# Patient Record
Sex: FEMALE | Race: WHITE | Employment: UNEMPLOYED | URBAN - METROPOLITAN AREA
[De-identification: names, ages, dates, MRNs, and addresses within clinical notes are randomized per-mention and may not be internally consistent; named-entity substitution may affect disease eponyms.]

---

## 2017-01-18 ENCOUNTER — OFFICE VISIT (OUTPATIENT)
Dept: PAIN MANAGEMENT | Age: 62
End: 2017-01-18

## 2017-01-18 VITALS
DIASTOLIC BLOOD PRESSURE: 86 MMHG | HEART RATE: 88 BPM | SYSTOLIC BLOOD PRESSURE: 142 MMHG | WEIGHT: 110 LBS | BODY MASS INDEX: 18.3 KG/M2

## 2017-01-18 DIAGNOSIS — M43.10 SPONDYLOLISTHESIS, GRADE 1: ICD-10-CM

## 2017-01-18 DIAGNOSIS — G89.29 CHRONIC MIDLINE LOW BACK PAIN, WITH SCIATICA PRESENCE UNSPECIFIED: ICD-10-CM

## 2017-01-18 DIAGNOSIS — G89.4 CHRONIC PAIN SYNDROME: ICD-10-CM

## 2017-01-18 DIAGNOSIS — M50.30 DDD (DEGENERATIVE DISC DISEASE), CERVICAL: ICD-10-CM

## 2017-01-18 DIAGNOSIS — M51.36 DDD (DEGENERATIVE DISC DISEASE), LUMBAR: ICD-10-CM

## 2017-01-18 DIAGNOSIS — M48.02 CERVICAL STENOSIS OF SPINAL CANAL: ICD-10-CM

## 2017-01-18 DIAGNOSIS — M62.838 MUSCLE SPASMS OF BOTH LOWER EXTREMITIES: ICD-10-CM

## 2017-01-18 DIAGNOSIS — Z79.899 ENCOUNTER FOR LONG-TERM (CURRENT) USE OF MEDICATIONS: Primary | ICD-10-CM

## 2017-01-18 DIAGNOSIS — M54.5 CHRONIC MIDLINE LOW BACK PAIN, WITH SCIATICA PRESENCE UNSPECIFIED: ICD-10-CM

## 2017-01-18 DIAGNOSIS — M47.817 LUMBOSACRAL SPONDYLOSIS WITHOUT MYELOPATHY: ICD-10-CM

## 2017-01-18 RX ORDER — OXYCODONE HCL 20 MG/1
20 TABLET, FILM COATED, EXTENDED RELEASE ORAL EVERY 12 HOURS
Qty: 60 TAB | Refills: 0 | Status: SHIPPED | OUTPATIENT
Start: 2017-01-25 | End: 2017-03-15 | Stop reason: SDUPTHER

## 2017-01-18 RX ORDER — OXYCODONE HYDROCHLORIDE 10 MG/1
10 TABLET ORAL
Qty: 90 TAB | Refills: 0 | Status: SHIPPED | OUTPATIENT
Start: 2017-01-25 | End: 2017-03-15 | Stop reason: SDUPTHER

## 2017-01-18 RX ORDER — OXYCODONE HCL 20 MG/1
20 TABLET, FILM COATED, EXTENDED RELEASE ORAL EVERY 12 HOURS
Qty: 60 TAB | Refills: 0 | Status: SHIPPED | OUTPATIENT
Start: 2017-02-24 | End: 2017-03-15 | Stop reason: SDUPTHER

## 2017-01-18 RX ORDER — OXYCODONE HYDROCHLORIDE 10 MG/1
10 TABLET ORAL
Qty: 90 TAB | Refills: 0 | Status: SHIPPED | OUTPATIENT
Start: 2017-02-24 | End: 2017-03-15 | Stop reason: SDUPTHER

## 2017-01-18 NOTE — PROGRESS NOTES
HISTORY OF PRESENT ILLNESS  Maribel Russo is a 64 y.o. female    HPI: Ms. Daniel Israel  returns today for f/u of chronic low back pain and neck pain. No lumbar or neck surgery. Prior injections with no benefit. No h/o PT. She continues unchanged since last visit. She reports that she spent some time with her mother in [place] and had a great time. She is still complaining of stressful situation at her home with her friends son. She is planning to have him evicted soon. She is otherwise doing well. She has been following up with her family care doctor regarding her depression/anxiety and reports that she is doing better. I would like to have her follow-up in 2 month for further evaluation and closer follow-up. Current management consists of OxyContin 20 mg every 12 hours, Valium 5 mg every 8 hours (further refills will be from her PCP), and Oxycodone IR 10 mg 3 times a day as needed. Medications are helping with pain control and quality of life. Her pain is 4/10 with medication and 8/10 without. Pt describes pain as aching and stabbing. Aggravating factors include bending and lifting. Relieved with rest, medication, and avoiding painful activities. Current treatment is helping to improve general activity, mood, walking, sleep, enjoyment of life    She  is otherwise doing well with no other complaints today. She denies any adverse events including nausea, vomiting, dizziness, constipation, hallucinations, or seizures. Medications were brought to visit today. Pill counts were appropriate    POC UDS today. Confirmation pending. PRIOR IMAGIN. MRI of the cervical spine shows multilevel DDD with osteophyte complex and disc protrusion. 2. MRI of the lumbar spine multilevel DDD with mild disc bulge and L5-S1 mild to moderate left and moderate right foraminal stenosis. Allergies   Allergen Reactions    Prozac [Fluoxetine] Rash and Itching       History reviewed.  No pertinent past surgical history. Review of Systems   Constitutional: Negative for chills and fever. HENT: Negative for congestion, ear discharge, ear pain and sore throat. Eyes: Negative for pain and discharge. Respiratory: Negative for hemoptysis and wheezing. Cardiovascular: Negative for chest pain and palpitations. Gastrointestinal: Negative for blood in stool, constipation, heartburn, melena and nausea. Genitourinary: Negative for dysuria and hematuria. Musculoskeletal: Positive for back pain, myalgias and neck pain. Skin: Negative for itching. Neurological: Negative for seizures and loss of consciousness. Psychiatric/Behavioral: Positive for depression. Negative for hallucinations, substance abuse and suicidal ideas. The patient is nervous/anxious. Physical Exam   Constitutional: She is oriented to person, place, and time and well-developed, well-nourished, and in no distress. No distress. HENT:   Head: Normocephalic and atraumatic. Eyes: EOM are normal.   Neck: Normal range of motion. Pulmonary/Chest: Effort normal.   Musculoskeletal: Normal range of motion. Neurological: She is alert and oriented to person, place, and time. Skin: Skin is warm and dry. No rash noted. She is not diaphoretic. No erythema. Psychiatric: Mood, memory, affect and judgment normal.   Nursing note and vitals reviewed. ASSESSMENT:    1. Encounter for long-term (current) use of medications    2. Chronic pain syndrome    3. DDD (degenerative disc disease), cervical    4. DDD (degenerative disc disease), lumbar    5. Lumbosacral spondylosis without myelopathy    6. Chronic midline low back pain, with sciatica presence unspecified    7. Spondylolisthesis, grade 1    8. Muscle spasms of both lower extremities    9.  Cervical stenosis of spinal canal           [place] Prescription Monitoring Program was reviewed which does not demonstrate aberrancies and/or inconsistencies with regard to the historical prescribing of controlled medications to this patient by other providers. PLAN / Pt Instructions:  1. Continue current plan with no evidence of addiction or diversion. Stable on current medication without adverse events. 2. Refill  Oxycontin  20 mg 2 times daily  3. Refill Oxycodone 10 mg up to 3 times daily as needed  4. Continue Valium 5 mg up to 3 times day as needed. She will discuss any further refills with her PCP and/or psychiatrist.  5. Discussed risks of addiction, dependency, and opioid induced hyperalgesia. 6. Return to clinic in 2 months    Medications Ordered Today   Medications    oxyCODONE ER (OXYCONTIN) 20 mg ER tablet     Sig: Take 1 Tab by mouth every twelve (12) hours for 30 days. Max Daily Amount: 40 mg. Dispense:  60 Tab     Refill:  0    oxyCODONE ER (OXYCONTIN) 20 mg ER tablet     Sig: Take 1 Tab by mouth every twelve (12) hours for 30 days. Max Daily Amount: 40 mg. Indications: CHRONIC PAIN, SEVERE PAIN     Dispense:  60 Tab     Refill:  0    oxyCODONE IR (ROXICODONE) 10 mg tab immediate release tablet     Sig: Take 1 Tab by mouth every eight (8) hours as needed for up to 30 days. Max Daily Amount: 30 mg. Dispense:  90 Tab     Refill:  0    oxyCODONE IR (ROXICODONE) 10 mg tab immediate release tablet     Sig: Take 1 Tab by mouth every eight (8) hours as needed for up to 30 days. Max Daily Amount: 30 mg. Indications: PAIN     Dispense:  90 Tab     Refill:  0       Pain medications prescribed with the objective of pain relief and improved physical and psychosocial function in this patient. Spent 25 minutes with patient today reviewing the treatment plan, goals of treatment plan, and limitations of the treatment plan, to include the potential for side effects from medications and procedures. Heidi Gandhi 1/18/2017      Note: Please excuse any typographical errors. Voice recognition software was used for this note and may cause mistakes.

## 2017-01-18 NOTE — PATIENT INSTRUCTIONS
1. Continue current plan with no evidence of addiction or diversion. Stable on current medication without adverse events. 2. Refill  Oxycontin  20 mg 2 times daily  3. Refill Oxycodone 10 mg up to 3 times daily as needed  4. Continue Valium 5 mg up to 3 times day as needed. She will discuss any further refills with her PCP and/or psychiatrist.  5. Discussed risks of addiction, dependency, and opioid induced hyperalgesia.    6. Return to clinic in 2 months

## 2017-01-18 NOTE — MR AVS SNAPSHOT
Visit Information Date & Time Provider Department Dept. Phone Encounter #  
 1/18/2017  1:00 PM Ada Mo, [facility] [phone] Follow-up Instructions Return in about 2 months (around 3/18/2017). Upcoming Health Maintenance Date Due Hepatitis C Screening 1955 Pneumococcal 19-64 Medium Risk (1 of 1 - PPSV23) 1/22/1974 DTaP/Tdap/Td series (1 - Tdap) 1/22/1976 PAP AKA CERVICAL CYTOLOGY 1/22/1976 BREAST CANCER SCRN MAMMOGRAM 1/22/2005 FOBT Q 1 YEAR AGE 50-75 1/22/2005 ZOSTER VACCINE AGE 60> 1/22/2015 INFLUENZA AGE 9 TO ADULT 8/1/2016 Allergies as of 1/18/2017  Review Complete On: 1/18/2017 By: Jocelyne Contreras LPN Severity Noted Reaction Type Reactions Prozac [Fluoxetine]  07/31/2013    Rash, Itching Current Immunizations  Never Reviewed No immunizations on file. Not reviewed this visit You Were Diagnosed With   
  
 Codes Comments Encounter for long-term (current) use of medications    -  Primary ICD-10-CM: N18.351 ICD-9-CM: V58.69 Chronic pain syndrome     ICD-10-CM: G89.4 ICD-9-CM: 338.4 DDD (degenerative disc disease), cervical     ICD-10-CM: M50.30 ICD-9-CM: 722.4 DDD (degenerative disc disease), lumbar     ICD-10-CM: M51.36 
ICD-9-CM: 722.52 Lumbosacral spondylosis without myelopathy     ICD-10-CM: S68.274 ICD-9-CM: 692. 3 Chronic midline low back pain, with sciatica presence unspecified     ICD-10-CM: M54.5, G89.29 ICD-9-CM: 724.2, 338.29 Spondylolisthesis, grade 1     ICD-10-CM: M43.10 ICD-9-CM: 738.4 Muscle spasms of both lower extremities     ICD-10-CM: S30.639 ICD-9-CM: 728.85 Cervical stenosis of spinal canal     ICD-10-CM: M48.02 
ICD-9-CM: 723.0 Vitals BP Pulse Weight(growth percentile) BMI OB Status Smoking Status  142/86 (BP 1 Location: Right arm, BP Patient Position: Sitting) 88 110 lb (49.9 kg) 18.3 kg/m2 Postmenopausal Current Every Day Smoker Vitals History BMI and BSA Data Body Mass Index Body Surface Area  
 18.3 kg/m 2 1.51 m 2 Preferred Pharmacy Pharmacy Name Phone Justine Tam [facility] [phone] Your Updated Medication List  
  
   
This list is accurate as of: 1/18/17  1:34 PM.  Always use your most recent med list.  
  
  
  
  
 aspirin 81 mg tablet Take 81 mg by mouth.  
  
 multivitamin tablet Commonly known as:  ONE A DAY Take 1 Tab by mouth daily. * oxyCODONE ER 20 mg ER tablet Commonly known as:  OxyCONTIN Take 1 Tab by mouth every twelve (12) hours for 30 days. Max Daily Amount: 40 mg. Start taking on:  1/25/2017 * oxyCODONE IR 10 mg Tab immediate release tablet Commonly known as:  Corrinne Mitten Take 1 Tab by mouth every eight (8) hours as needed for up to 30 days. Max Daily Amount: 30 mg. Start taking on:  1/25/2017 * oxyCODONE ER 20 mg ER tablet Commonly known as:  OxyCONTIN Take 1 Tab by mouth every twelve (12) hours for 30 days. Max Daily Amount: 40 mg. Indications: CHRONIC PAIN, SEVERE PAIN Start taking on:  2/24/2017 * oxyCODONE IR 10 mg Tab immediate release tablet Commonly known as:  Corrinne Mitten Take 1 Tab by mouth every eight (8) hours as needed for up to 30 days. Max Daily Amount: 30 mg. Indications: PAIN Start taking on:  2/24/2017 VITAMIN D3 1,000 unit Cap Generic drug:  cholecalciferol Take  by mouth. * Notice: This list has 4 medication(s) that are the same as other medications prescribed for you. Read the directions carefully, and ask your doctor or other care provider to review them with you. Prescriptions Printed Refills  
 oxyCODONE ER (OXYCONTIN) 20 mg ER tablet 0 Starting on: 1/25/2017 Sig: Take 1 Tab by mouth every twelve (12) hours for 30 days. Max Daily Amount: 40 mg.  
 Class: Print Route: Oral  
 oxyCODONE ER (OXYCONTIN) 20 mg ER tablet 0 Starting on: 2/24/2017 Sig: Take 1 Tab by mouth every twelve (12) hours for 30 days. Max Daily Amount: 40 mg. Indications: CHRONIC PAIN, SEVERE PAIN Class: Print Route: Oral  
 oxyCODONE IR (ROXICODONE) 10 mg tab immediate release tablet 0 Starting on: 1/25/2017 Sig: Take 1 Tab by mouth every eight (8) hours as needed for up to 30 days. Max Daily Amount: 30 mg.  
 Class: Print Route: Oral  
 oxyCODONE IR (ROXICODONE) 10 mg tab immediate release tablet 0 Starting on: 2/24/2017 Sig: Take 1 Tab by mouth every eight (8) hours as needed for up to 30 days. Max Daily Amount: 30 mg. Indications: PAIN Class: Print Route: Oral  
  
We Performed the Following AMB POC DRUG SCREEN () [ [facility] [place]] DRUG SCREEN [[id number] Custom] Follow-up Instructions Return in about 2 months (around 3/18/2017). Patient Instructions 1. Continue current plan with no evidence of addiction or diversion. Stable on current medication without adverse events. 2. Refill  Oxycontin  20 mg 2 times daily 3. Refill Oxycodone 10 mg up to 3 times daily as needed 4. Continue Valium 5 mg up to 3 times day as needed. She will discuss any further refills with her PCP and/or psychiatrist. 
5. Discussed risks of addiction, dependency, and opioid induced hyperalgesia. 6. Return to clinic in 2 months Introducing [facility]! Deion Dubose introduces [employer] patient portal. Now you can access parts of your medical record, email your doctor's office, and request medication refills online. 1. In your internet browser, go to [email or web address] 2. Click on the First Time User? Click Here link in the Sign In box. You will see the New Member Sign Up page. 3. Enter your [employer] Access Code exactly as it appears below. You will not need to use this code after youve completed the sign-up process.  If you do not sign up before the expiration date, you must request a new code. · [employer] Access Code: [email or web address] Expires: 4/18/2017  1:33 PM 
 
4. Enter the last four digits of your Social Security Number (xxxx) and Date of Birth (mm/dd/yyyy) as indicated and click Submit. You will be taken to the next sign-up page. 5. Create a [employer] ID. This will be your [employer] login ID and cannot be changed, so think of one that is secure and easy to remember. 6. Create a [employer] password. You can change your password at any time. 7. Enter your Password Reset Question and Answer. This can be used at a later time if you forget your password. 8. Enter your e-mail address. You will receive e-mail notification when new information is available in [address] 9. Click Sign Up. You can now view and download portions of your medical record. 10. Click the Download Summary menu link to download a portable copy of your medical information. If you have questions, please visit the Frequently Asked Questions section of the [employer] website. Remember, [employer] is NOT to be used for urgent needs. For medical emergencies, dial 911. Now available from your iPhone and Android! Please provide this summary of care documentation to your next provider. Your primary care clinician is listed as NOT ON FILE. If you have any questions after today's visit, please call [phone].

## 2017-01-18 NOTE — PROGRESS NOTES
Nursing Notes    Patient presents to the office today in follow-up. Patient rates her pain at 6/10 on the numerical pain scale. Reviewed medications with counts as follows:    Rx Date filled Qty Dispensed Pill Count Last Dose Short     oxycontin 20mg  12/26/16 60 17 This am  No    Oxycodone 10mg 12/26/16 90 29 This am  No    Valium 5mg 11/23/16 90 51 Today  No                             Comments:     POC UDS was performed in office today    Any new labs or imaging since last appointment? YES; labwork     Have you been to an emergency room (ER) or urgent care clinic since your last visit? YES  ; [facility] for chest pain     Have you been hospitalized since your last visit? NO     If yes, where, when, and reason for visit? Have you seen or consulted any other health care providers outside of the [facility]  since your last visit? YES     If yes, where, when, and reason for visit? Emergency dept physicians       HM deferred to pcp.

## 2017-01-19 LAB
ALCOHOL UR POC: NORMAL
AMPHETAMINES UR POC: NEGATIVE
BARBITURATES UR POC: NEGATIVE
BENZODIAZEPINES UR POC: NORMAL
BUPRENORPHINE UR POC: NORMAL
CANNABINOIDS UR POC: NEGATIVE
CARISOPRODOL UR POC: NORMAL
COCAINE UR POC: NEGATIVE
FENTANYL UR POC: NORMAL
MDMA/ECSTASY UR POC: NEGATIVE
METHADONE UR POC: NEGATIVE
METHAMPHETAMINE UR POC: NEGATIVE
METHYLPHENIDATE UR POC: NEGATIVE
OPIATES UR POC: NEGATIVE
OXYCODONE UR POC: NORMAL
PHENCYCLIDINE UR POC: NORMAL
PROPOXYPHENE UR POC: NORMAL
TRAMADOL UR POC: NORMAL
TRICYCLICS UR POC: NEGATIVE

## 2017-03-15 ENCOUNTER — OFFICE VISIT (OUTPATIENT)
Dept: PAIN MANAGEMENT | Age: 62
End: 2017-03-15

## 2017-03-15 VITALS
WEIGHT: 110 LBS | HEART RATE: 71 BPM | SYSTOLIC BLOOD PRESSURE: 156 MMHG | BODY MASS INDEX: 18.3 KG/M2 | DIASTOLIC BLOOD PRESSURE: 78 MMHG

## 2017-03-15 DIAGNOSIS — M54.5 CHRONIC MIDLINE LOW BACK PAIN, WITH SCIATICA PRESENCE UNSPECIFIED: ICD-10-CM

## 2017-03-15 DIAGNOSIS — G89.4 CHRONIC PAIN SYNDROME: ICD-10-CM

## 2017-03-15 DIAGNOSIS — M43.10 SPONDYLOLISTHESIS, GRADE 1: ICD-10-CM

## 2017-03-15 DIAGNOSIS — M47.812 CERVICAL SPONDYLOSIS WITHOUT MYELOPATHY: ICD-10-CM

## 2017-03-15 DIAGNOSIS — G54.2: ICD-10-CM

## 2017-03-15 DIAGNOSIS — G89.29 CHRONIC MIDLINE LOW BACK PAIN, WITH SCIATICA PRESENCE UNSPECIFIED: ICD-10-CM

## 2017-03-15 DIAGNOSIS — M54.10 RADICULITIS: ICD-10-CM

## 2017-03-15 DIAGNOSIS — M47.817 LUMBOSACRAL SPONDYLOSIS WITHOUT MYELOPATHY: ICD-10-CM

## 2017-03-15 DIAGNOSIS — M48.02 CERVICAL STENOSIS OF SPINAL CANAL: ICD-10-CM

## 2017-03-15 DIAGNOSIS — M54.2 NECK PAIN: ICD-10-CM

## 2017-03-15 RX ORDER — OXYCODONE HCL 20 MG/1
20 TABLET, FILM COATED, EXTENDED RELEASE ORAL EVERY 12 HOURS
Qty: 60 TAB | Refills: 0 | Status: SHIPPED | OUTPATIENT
Start: 2017-04-25 | End: 2017-05-16 | Stop reason: SDUPTHER

## 2017-03-15 RX ORDER — OXYCODONE HYDROCHLORIDE 10 MG/1
10 TABLET ORAL
Qty: 90 TAB | Refills: 0 | Status: SHIPPED | OUTPATIENT
Start: 2017-03-26 | End: 2017-05-16 | Stop reason: SDUPTHER

## 2017-03-15 RX ORDER — OXYCODONE HYDROCHLORIDE 10 MG/1
10 TABLET ORAL
Qty: 90 TAB | Refills: 0 | Status: SHIPPED | OUTPATIENT
Start: 2017-04-25 | End: 2017-05-16 | Stop reason: SDUPTHER

## 2017-03-15 RX ORDER — OXYCODONE HCL 20 MG/1
20 TABLET, FILM COATED, EXTENDED RELEASE ORAL EVERY 12 HOURS
Qty: 60 TAB | Refills: 0 | Status: SHIPPED | OUTPATIENT
Start: 2017-03-26 | End: 2017-05-16 | Stop reason: SDUPTHER

## 2017-03-15 NOTE — PROGRESS NOTES
Nursing Notes    Patient presents to the office today in follow-up. Patient rates her pain at 5/10 on the numerical pain scale. Reviewed medications with counts as follows:    Rx Date filled Qty Dispensed Pill Count Last Dose Short   oxycontin 20 2/24/17 60 27 3/15/17 no   zaid 10 2/24/17 90 37 3/15/17 no         Comments:     POC UDS was not performed in office today    Any new labs or imaging since last appointment? NO    Have you been to an emergency room (ER) or urgent care clinic since your last visit? NO            Have you been hospitalized since your last visit? NO     If yes, where, when, and reason for visit? Have you seen or consulted any other health care providers outside of the [address] [place] [address]  since your last visit? NO     If yes, where, when, and reason for visit? Ms. Ella Farah has a reminder for a \"due or due soon\" health maintenance. I have asked that she contact her primary care provider for follow-up on this health maintenance.

## 2017-03-15 NOTE — PATIENT INSTRUCTIONS
1. Continue current plan with no evidence of addiction or diversion. Stable on current medication without adverse events. 2. Refill  Oxycontin  20 mg 2 times daily  3. Refill Oxycodone 10 mg up to 3 times daily as needed  4. Valium 5 mg up to 3 times day as needed. She will discuss any further refills with her PCP and/or psychiatrist.  5. Discussed risks of addiction, dependency, and opioid induced hyperalgesia.    6. Return to clinic in 2 months

## 2017-03-15 NOTE — PROGRESS NOTES
HISTORY OF PRESENT ILLNESS  Alessandro Fung is a 58 y.o. female    HPI: Ms. Parag Worthy  returns today for f/u of chronic low back pain and neck pain. No lumbar or neck surgery. Prior injections with no benefit. No h/o PT. She continues unchanged since last visit. She continues to complain of stressful situation in her home with her friends son. She has attempted to evict him but has to wait for 64 days. I explained to her previously I would be unable to refill her Valium. She has discussed this medication with her PCP who has recommended changing her medication to a different agent. She is reluctant on changing this medication. I have discussed with her possible referral to psychiatry/psychologist.  She is planning to discuss this further with her PCP. I will have her follow-up in 2 months for reassessment. Current management consists of OxyContin 20 mg every 12 hours, and Oxycodone IR 10 mg 3 times a day as needed. Valium 5 mg will now come from her PCP. Medications are helping with pain control and quality of life. Her pain is 4/10 with medication and 8/10 without. Pt describes pain as aching and stabbing. Aggravating factors include bending and lifting. Relieved with rest, medication, and avoiding painful activities. Current treatment is helping to improve general activity, mood, walking, sleep, enjoyment of life    She  is otherwise doing well with no other complaints today. She denies any adverse events including nausea, vomiting, dizziness, constipation, hallucinations, or seizures. PRIOR IMAGIN. MRI of the cervical spine shows multilevel DDD with osteophyte complex and disc protrusion. 2. MRI of the lumbar spine multilevel DDD with mild disc bulge and L5-S1 mild to moderate left and moderate right foraminal stenosis. Allergies   Allergen Reactions    Prozac [Fluoxetine] Rash and Itching       History reviewed. No pertinent surgical history.       Review of Systems Constitutional: Negative for chills and fever. HENT: Negative for congestion, ear discharge, ear pain and sore throat. Eyes: Negative for pain and discharge. Respiratory: Negative for hemoptysis and wheezing. Cardiovascular: Negative for chest pain and palpitations. Gastrointestinal: Negative for blood in stool, constipation, heartburn, melena and nausea. Genitourinary: Negative for dysuria and hematuria. Musculoskeletal: Positive for back pain, myalgias and neck pain. Skin: Negative for itching. Neurological: Negative for seizures and loss of consciousness. Psychiatric/Behavioral: Positive for depression. Negative for hallucinations, substance abuse and suicidal ideas. The patient is nervous/anxious. Physical Exam   Constitutional: She is oriented to person, place, and time and well-developed, well-nourished, and in no distress. No distress. HENT:   Head: Normocephalic and atraumatic. Eyes: EOM are normal.   Neck: Normal range of motion. Pulmonary/Chest: Effort normal.   Musculoskeletal: Normal range of motion. Neurological: She is alert and oriented to person, place, and time. Skin: Skin is warm and dry. No rash noted. She is not diaphoretic. No erythema. Psychiatric: Mood, memory, affect and judgment normal.   Nursing note and vitals reviewed. ASSESSMENT:    1. Cervical stenosis of spinal canal    2. Neck pain    3. Chronic midline low back pain, with sciatica presence unspecified    4. Chronic pain syndrome    5. Radiculitis    6. Spondylolisthesis, grade 1    7. Lumbosacral spondylosis without myelopathy    8. Cervical spondylosis without myelopathy    9. Cervical root lesions, not elsewhere classified         [place] Prescription Monitoring Program was reviewed which does not demonstrate aberrancies and/or inconsistencies with regard to the historical prescribing of controlled medications to this patient by other providers.     PLAN / Pt Instructions:  1. Continue current plan with no evidence of addiction or diversion. Stable on current medication without adverse events. 2. Refill Oxycontin  20 mg 2 times daily  3. Refill Oxycodone 10 mg up to 3 times daily as needed  4. Continue Valium 5 mg up to 3 times day from PCP  5. Discussed risks of addiction, dependency, and opioid induced hyperalgesia. 6. Return to clinic in 2 months    Medications Ordered Today   Medications    oxyCODONE ER (OXYCONTIN) 20 mg ER tablet     Sig: Take 1 Tab by mouth every twelve (12) hours for 30 days. Max Daily Amount: 40 mg. Indications: Chronic Pain, Severe Pain     Dispense:  60 Tab     Refill:  0    oxyCODONE ER (OXYCONTIN) 20 mg ER tablet     Sig: Take 1 Tab by mouth every twelve (12) hours for 30 days. Max Daily Amount: 40 mg. Dispense:  60 Tab     Refill:  0    oxyCODONE IR (ROXICODONE) 10 mg tab immediate release tablet     Sig: Take 1 Tab by mouth every eight (8) hours as needed for up to 30 days. Max Daily Amount: 30 mg. Indications: Pain     Dispense:  90 Tab     Refill:  0    oxyCODONE IR (ROXICODONE) 10 mg tab immediate release tablet     Sig: Take 1 Tab by mouth every eight (8) hours as needed for up to 30 days. Max Daily Amount: 30 mg. Dispense:  90 Tab     Refill:  0       Pain medications prescribed with the objective of pain relief and improved physical and psychosocial function in this patient. Spent 25 minutes with patient today reviewing the treatment plan, goals of treatment plan, and limitations of the treatment plan, to include the potential for side effects from medications and procedures. Jorja Crigler, [place] 3/15/2017      Note: Please excuse any typographical errors. Voice recognition software was used for this note and may cause mistakes.

## 2017-05-16 ENCOUNTER — OFFICE VISIT (OUTPATIENT)
Dept: PAIN MANAGEMENT | Age: 62
End: 2017-05-16

## 2017-05-16 VITALS — SYSTOLIC BLOOD PRESSURE: 138 MMHG | HEART RATE: 84 BPM | DIASTOLIC BLOOD PRESSURE: 76 MMHG | RESPIRATION RATE: 18 BRPM

## 2017-05-16 DIAGNOSIS — M50.30 DDD (DEGENERATIVE DISC DISEASE), CERVICAL: ICD-10-CM

## 2017-05-16 DIAGNOSIS — F41.1 GAD (GENERALIZED ANXIETY DISORDER): ICD-10-CM

## 2017-05-16 DIAGNOSIS — M47.812 CERVICAL SPONDYLOSIS WITHOUT MYELOPATHY: ICD-10-CM

## 2017-05-16 DIAGNOSIS — G89.29 CHRONIC MIDLINE LOW BACK PAIN, WITH SCIATICA PRESENCE UNSPECIFIED: Primary | ICD-10-CM

## 2017-05-16 DIAGNOSIS — F43.10 PTSD (POST-TRAUMATIC STRESS DISORDER): ICD-10-CM

## 2017-05-16 DIAGNOSIS — M54.2 NECK PAIN: ICD-10-CM

## 2017-05-16 DIAGNOSIS — M51.36 DDD (DEGENERATIVE DISC DISEASE), LUMBAR: ICD-10-CM

## 2017-05-16 DIAGNOSIS — M43.10 SPONDYLOLISTHESIS, GRADE 1: ICD-10-CM

## 2017-05-16 DIAGNOSIS — M47.817 LUMBOSACRAL SPONDYLOSIS WITHOUT MYELOPATHY: ICD-10-CM

## 2017-05-16 DIAGNOSIS — Z79.899 ENCOUNTER FOR LONG-TERM (CURRENT) USE OF MEDICATIONS: ICD-10-CM

## 2017-05-16 DIAGNOSIS — G89.4 CHRONIC PAIN SYNDROME: ICD-10-CM

## 2017-05-16 DIAGNOSIS — M54.5 CHRONIC MIDLINE LOW BACK PAIN, WITH SCIATICA PRESENCE UNSPECIFIED: Primary | ICD-10-CM

## 2017-05-16 DIAGNOSIS — M48.02 CERVICAL STENOSIS OF SPINAL CANAL: ICD-10-CM

## 2017-05-16 DIAGNOSIS — F31.9 BIPOLAR DISORDER, UNSPECIFIED (HCC): ICD-10-CM

## 2017-05-16 LAB
ALCOHOL UR POC: NORMAL
AMPHETAMINES UR POC: NEGATIVE
BARBITURATES UR POC: NEGATIVE
BENZODIAZEPINES UR POC: NEGATIVE
BUPRENORPHINE UR POC: NORMAL
CANNABINOIDS UR POC: NEGATIVE
CARISOPRODOL UR POC: NORMAL
COCAINE UR POC: NEGATIVE
FENTANYL UR POC: NORMAL
MDMA/ECSTASY UR POC: NEGATIVE
METHADONE UR POC: NEGATIVE
METHAMPHETAMINE UR POC: NEGATIVE
METHYLPHENIDATE UR POC: NEGATIVE
OPIATES UR POC: NEGATIVE
OXYCODONE UR POC: NORMAL
PHENCYCLIDINE UR POC: NEGATIVE
PROPOXYPHENE UR POC: NORMAL
TRAMADOL UR POC: NORMAL
TRICYCLICS UR POC: NEGATIVE

## 2017-05-16 RX ORDER — NALOXONE HYDROCHLORIDE 4 MG/.1ML
4 SPRAY NASAL AS NEEDED
Qty: 1 BOX | Refills: 0 | Status: SHIPPED | OUTPATIENT
Start: 2017-05-16 | End: 2021-09-20

## 2017-05-16 RX ORDER — OXYCODONE HCL 20 MG/1
20 TABLET, FILM COATED, EXTENDED RELEASE ORAL EVERY 12 HOURS
Qty: 60 TAB | Refills: 0 | Status: SHIPPED | OUTPATIENT
Start: 2017-05-16 | End: 2017-07-17 | Stop reason: SDUPTHER

## 2017-05-16 RX ORDER — OXYCODONE HYDROCHLORIDE 10 MG/1
10 TABLET ORAL
Qty: 90 TAB | Refills: 0 | Status: SHIPPED | OUTPATIENT
Start: 2017-05-16 | End: 2017-07-17 | Stop reason: SDUPTHER

## 2017-05-16 RX ORDER — OXYCODONE HCL 20 MG/1
20 TABLET, FILM COATED, EXTENDED RELEASE ORAL EVERY 12 HOURS
Qty: 60 TAB | Refills: 0 | Status: SHIPPED | OUTPATIENT
Start: 2017-06-15 | End: 2017-07-17 | Stop reason: SDUPTHER

## 2017-05-16 RX ORDER — OXYCODONE HYDROCHLORIDE 10 MG/1
10 TABLET ORAL
Qty: 90 TAB | Refills: 0 | Status: SHIPPED | OUTPATIENT
Start: 2017-06-15 | End: 2017-07-17 | Stop reason: SDUPTHER

## 2017-05-16 NOTE — PROGRESS NOTES
Nursing Notes    Patient presents to the office today in follow-up. Reviewed medications with counts as follows:    Rx Date filled Qty Dispensed Pill Count Last Dose Short   oxycontin 20 mg 4/24/17 60 21 today no   Oxycodone 10 mg 4/24/17 90 32 today no   Ms. Kelley Jeffrey has a reminder for a \"due or due soon\" health maintenance. I have asked that she contact her primary care provider for follow-up on this health maintenance. POC UDS was performed in office today    Any new labs or imaging since last appointment? NO    Have you been to an emergency room (ER) or urgent care clinic since your last visit? NO            Have you been hospitalized since your last visit? NO     If yes, where, when, and reason for visit? Have you seen or consulted any other health care providers outside of the [facility]  since your last visit? NO     If yes, where, when, and reason for visit?

## 2017-05-16 NOTE — MR AVS SNAPSHOT
Visit Information Date & Time Provider Department Dept. Phone Encounter #  
 5/16/2017  2:30 PM Dayan Jackson MD [facility] [id number] Follow-up Instructions Return in about 2 months (around 7/16/2017). Follow-up and Disposition History Upcoming Health Maintenance Date Due Hepatitis C Screening 1955 Pneumococcal 19-64 Medium Risk (1 of 1 - PPSV23) 1/22/1974 DTaP/Tdap/Td series (1 - Tdap) 1/22/1976 PAP AKA CERVICAL CYTOLOGY 1/22/1976 BREAST CANCER SCRN MAMMOGRAM 1/22/2005 FOBT Q 1 YEAR AGE 50-75 1/22/2005 ZOSTER VACCINE AGE 60> 1/22/2015 INFLUENZA AGE 9 TO ADULT 8/1/2017 Allergies as of 5/16/2017  Review Complete On: 5/16/2017 By: Dayan Jackson MD  
  
 Severity Noted Reaction Type Reactions Prozac [Fluoxetine]  07/31/2013    Rash, Itching Current Immunizations  Never Reviewed No immunizations on file. Not reviewed this visit You Were Diagnosed With   
  
 Codes Comments Chronic midline low back pain, with sciatica presence unspecified    -  Primary ICD-10-CM: M54.5, G89.29 ICD-9-CM: 724.2, 338.29 Neck pain     ICD-10-CM: M54.2 ICD-9-CM: 723.1 Encounter for long-term (current) use of medications     ICD-10-CM: Z79.899 ICD-9-CM: V58.69 Cervical stenosis of spinal canal     ICD-10-CM: M48.02 
ICD-9-CM: 723.0 Chronic pain syndrome     ICD-10-CM: G89.4 ICD-9-CM: 338.4 KASH (generalized anxiety disorder)     ICD-10-CM: F41.1 ICD-9-CM: 300.02 PTSD (post-traumatic stress disorder)     ICD-10-CM: F43.10 ICD-9-CM: 309.81 Bipolar disorder, unspecified ([facility]     ICD-10-CM: F31.9 ICD-9-CM: 296.80 Spondylolisthesis, grade 1     ICD-10-CM: M43.10 ICD-9-CM: 738.4 DDD (degenerative disc disease), lumbar     ICD-10-CM: M51.36 
ICD-9-CM: 722.52 Lumbosacral spondylosis without myelopathy     ICD-10-CM: H30.707 ICD-9-CM: 721.3 DDD (degenerative disc disease), cervical     ICD-10-CM: M50.30 ICD-9-CM: 722.4 Cervical spondylosis without myelopathy     ICD-10-CM: M77.033 ICD-9-CM: 721.0 Vitals BP Pulse Resp OB Status Smoking Status 138/76 84 18 Postmenopausal Current Every Day Smoker Vitals History Preferred Pharmacy Pharmacy Name Phone [address] [facility] [phone] Your Updated Medication List  
  
   
This list is accurate as of: 5/16/17  2:41 PM.  Always use your most recent med list.  
  
  
  
  
 aspirin 81 mg tablet Take 81 mg by mouth.  
  
 multivitamin tablet Commonly known as:  ONE A DAY Take 1 Tab by mouth daily. naloxone 4 mg/actuation Spry 4 mg by Nasal route as needed. * oxyCODONE ER 20 mg ER tablet Commonly known as:  OxyCONTIN Take 1 Tab by mouth every twelve (12) hours for 30 days. Max Daily Amount: 40 mg.  
  
 * oxyCODONE IR 10 mg Tab immediate release tablet Commonly known as:  Cristo Ton Take 1 Tab by mouth every eight (8) hours as needed for up to 30 days. Max Daily Amount: 30 mg.  
  
 * oxyCODONE ER 20 mg ER tablet Commonly known as:  OxyCONTIN Take 1 Tab by mouth every twelve (12) hours for 30 days. Max Daily Amount: 40 mg. Indications: Chronic Pain, Severe Pain Start taking on:  6/15/2017 * oxyCODONE IR 10 mg Tab immediate release tablet Commonly known as:  Cristo Ton Take 1 Tab by mouth every eight (8) hours as needed for up to 30 days. Max Daily Amount: 30 mg. Indications: Pain Start taking on:  6/15/2017 VITAMIN D3 1,000 unit Cap Generic drug:  cholecalciferol Take  by mouth. * Notice: This list has 4 medication(s) that are the same as other medications prescribed for you. Read the directions carefully, and ask your doctor or other care provider to review them with you. Prescriptions Printed Refills oxyCODONE ER (OXYCONTIN) 20 mg ER tablet 0 Sig: Take 1 Tab by mouth every twelve (12) hours for 30 days. Max Daily Amount: 40 mg.  
 Class: Print Route: Oral  
 oxyCODONE IR (ROXICODONE) 10 mg tab immediate release tablet 0 Sig: Take 1 Tab by mouth every eight (8) hours as needed for up to 30 days. Max Daily Amount: 30 mg.  
 Class: Print Route: Oral  
 oxyCODONE ER (OXYCONTIN) 20 mg ER tablet 0 Starting on: 6/15/2017 Sig: Take 1 Tab by mouth every twelve (12) hours for 30 days. Max Daily Amount: 40 mg. Indications: Chronic Pain, Severe Pain Class: Print Route: Oral  
 oxyCODONE IR (ROXICODONE) 10 mg tab immediate release tablet 0 Starting on: 6/15/2017 Sig: Take 1 Tab by mouth every eight (8) hours as needed for up to 30 days. Max Daily Amount: 30 mg. Indications: Pain Class: Print Route: Oral  
 naloxone 4 mg/actuation spry 0 Si mg by Nasal route as needed. Class: Print Route: Nasal  
  
We Performed the Following AMB POC DRUG SCREEN () [ [facility]] DRUG SCREEN [[id number] Custom] Follow-up Instructions Return in about 2 months (around 2017). Introducing [facility]! Mark Leonard introduces [employer] patient portal. Now you can access parts of your medical record, email your doctor's office, and request medication refills online. 1. In your internet browser, go to [email or web address] 2. Click on the First Time User? Click Here link in the Sign In box. You will see the New Member Sign Up page. 3. Enter your [employer] Access Code exactly as it appears below. You will not need to use this code after youve completed the sign-up process. If you do not sign up before the expiration date, you must request a new code. · [employer] Access Code: [email or web address] Expires: 2017  2:37 PM 
 
4.  Enter the last four digits of your Social Security Number (xxxx) and Date of Birth (mm/dd/yyyy) as indicated and click Submit. You will be taken to the next sign-up page. 5. Create a [employer] ID. This will be your [employer] login ID and cannot be changed, so think of one that is secure and easy to remember. 6. Create a [employer] password. You can change your password at any time. 7. Enter your Password Reset Question and Answer. This can be used at a later time if you forget your password. 8. Enter your e-mail address. You will receive e-mail notification when new information is available in [address] 9. Click Sign Up. You can now view and download portions of your medical record. 10. Click the Download Summary menu link to download a portable copy of your medical information. If you have questions, please visit the Frequently Asked Questions section of the [employer] website. Remember, [employer] is NOT to be used for urgent needs. For medical emergencies, dial 911. Now available from your iPhone and Android! Please provide this summary of care documentation to your next provider. Your primary care clinician is listed as NOT ON FILE. If you have any questions after today's visit, please call [phone].

## 2017-05-16 NOTE — PROGRESS NOTES
HISTORY OF PRESENT ILLNESS  Shayne Salmeron is a 58 y.o. female. HPI Comments: Meds help with pain control and quality of life. No new side effects reported today. Visit survey reviewed and will be scanned.  reviewed. Recent average level of pain(out of 10)-5  Chief complaint low back pain, neck pain  Chronic pain  Medication helps improve multiple aspects of quality of life. 70% complete relief in the past 30 days  She is continuing to use OxyContin 20 mg twice a day  Oxycodone 10 mg 3 times a day as needed      Measuring clinical outcomes of chronic pain patients. This was reviewed today. The survey will be scanned. Please see the survey for details. Total score-8      Review of Systems   Constitutional: Negative for chills and fever. HENT: Negative for congestion, ear discharge, ear pain and sore throat. Eyes: Negative for pain and discharge. Respiratory: Negative for hemoptysis and wheezing. Cardiovascular: Negative for chest pain and palpitations. Gastrointestinal: Negative for blood in stool, constipation, heartburn, melena and nausea. Genitourinary: Negative for dysuria and hematuria. Musculoskeletal: Positive for back pain, myalgias and neck pain. Skin: Negative for itching. Neurological: Negative for seizures and loss of consciousness. Psychiatric/Behavioral: Positive for depression. Negative for hallucinations, substance abuse and suicidal ideas. The patient is nervous/anxious. Physical Exam   Constitutional: She appears well-developed and well-nourished. She is cooperative. She does not have a sickly appearance. HENT:   Head: Normocephalic and atraumatic. Right Ear: External ear normal. No drainage. Left Ear: External ear normal. No drainage. Nose: Nose normal.   Eyes: Lids are normal. Right eye exhibits no discharge. Left eye exhibits no discharge. Right conjunctiva has no hemorrhage. Left conjunctiva has no hemorrhage. Neck: Neck supple.  No tracheal deviation present. No thyroid mass present. Pulmonary/Chest: Effort normal. No respiratory distress. Neurological: She is alert. No cranial nerve deficit. Skin: Skin is intact. No rash noted. Psychiatric: Her speech is normal. Her affect is not angry. She does not express inappropriate judgment. Nursing note and vitals reviewed. ASSESSMENT and PLAN  Encounter Diagnoses   Name Primary?  Chronic midline low back pain, with sciatica presence unspecified Yes    Neck pain     Encounter for long-term (current) use of medications     Cervical stenosis of spinal canal     Chronic pain syndrome     KASH (generalized anxiety disorder)     PTSD (post-traumatic stress disorder)     Bipolar disorder, unspecified (HCC)     Spondylolisthesis, grade 1     DDD (degenerative disc disease), lumbar     Lumbosacral spondylosis without myelopathy     DDD (degenerative disc disease), cervical     Cervical spondylosis without myelopathy    No indicators for recent medication misuse.  reviewed. Pain Meds and Quality Of Life have been reviewed. Nonpharmacologic therapy and non-opioid pharmacologic therapy were considered. If opioid therapy is prescribed, this is only if the expected benefits are anticipated to outweigh risks. Possible changes to treatment plan considered. Support/education given as needed. Today-medications are as listed. No significant changes to medications. Follow up -- 2 months.    --Urine test or oral swab today. Also, the prescription monitoring program was reviewed today. The majority of today's visit was spent counseling and coordinating care. Total visit time-40 minutes. -Dragon medical dictation software was used for portions of this report. Unintended errors may occur. Because the patient's current regimen places him/her at increased risk for possible overdose, a prescription for naloxone is being provided.   The patient understands that this medication is only to be used in the setting of a possible overdose and that inadvertent use of this medication could precipitate overt withdrawal.  I discussed naloxone with the patient today. In addition, the patient has received the naloxone instruction sheet.

## 2017-07-17 ENCOUNTER — OFFICE VISIT (OUTPATIENT)
Dept: PAIN MANAGEMENT | Age: 62
End: 2017-07-17

## 2017-07-17 VITALS
HEART RATE: 87 BPM | SYSTOLIC BLOOD PRESSURE: 144 MMHG | TEMPERATURE: 98.1 F | RESPIRATION RATE: 12 BRPM | BODY MASS INDEX: 17.49 KG/M2 | HEIGHT: 65 IN | WEIGHT: 105 LBS | DIASTOLIC BLOOD PRESSURE: 91 MMHG

## 2017-07-17 DIAGNOSIS — M48.02 CERVICAL STENOSIS OF SPINAL CANAL: ICD-10-CM

## 2017-07-17 DIAGNOSIS — G89.29 CHRONIC MIDLINE LOW BACK PAIN, WITH SCIATICA PRESENCE UNSPECIFIED: ICD-10-CM

## 2017-07-17 DIAGNOSIS — M54.5 CHRONIC MIDLINE LOW BACK PAIN, WITH SCIATICA PRESENCE UNSPECIFIED: ICD-10-CM

## 2017-07-17 DIAGNOSIS — Z79.899 ENCOUNTER FOR LONG-TERM (CURRENT) USE OF HIGH-RISK MEDICATION: ICD-10-CM

## 2017-07-17 DIAGNOSIS — M41.9 SCOLIOSIS, UNSPECIFIED SCOLIOSIS TYPE, UNSPECIFIED SPINAL REGION: ICD-10-CM

## 2017-07-17 DIAGNOSIS — G89.4 CHRONIC PAIN SYNDROME: ICD-10-CM

## 2017-07-17 DIAGNOSIS — M50.30 DDD (DEGENERATIVE DISC DISEASE), CERVICAL: ICD-10-CM

## 2017-07-17 DIAGNOSIS — M62.838 MUSCLE SPASMS OF BOTH LOWER EXTREMITIES: ICD-10-CM

## 2017-07-17 DIAGNOSIS — M47.812 CERVICAL SPONDYLOSIS WITHOUT MYELOPATHY: ICD-10-CM

## 2017-07-17 DIAGNOSIS — M51.36 DDD (DEGENERATIVE DISC DISEASE), LUMBAR: Primary | ICD-10-CM

## 2017-07-17 RX ORDER — OXYCODONE HYDROCHLORIDE 10 MG/1
10 TABLET ORAL
Qty: 90 TAB | Refills: 0 | Status: SHIPPED | OUTPATIENT
Start: 2017-07-25 | End: 2017-09-12 | Stop reason: SDUPTHER

## 2017-07-17 RX ORDER — OXYCODONE HCL 20 MG/1
20 TABLET, FILM COATED, EXTENDED RELEASE ORAL EVERY 12 HOURS
COMMUNITY
End: 2018-06-22 | Stop reason: SDUPTHER

## 2017-07-17 RX ORDER — OXYCODONE HYDROCHLORIDE 10 MG/1
10 TABLET ORAL
Qty: 90 TAB | Refills: 0 | Status: SHIPPED | OUTPATIENT
Start: 2017-08-24 | End: 2017-09-23

## 2017-07-17 RX ORDER — OXYCODONE HCL 20 MG/1
20 TABLET, FILM COATED, EXTENDED RELEASE ORAL EVERY 12 HOURS
Qty: 60 TAB | Refills: 0 | Status: SHIPPED | OUTPATIENT
Start: 2017-07-24 | End: 2017-09-12 | Stop reason: SDUPTHER

## 2017-07-17 RX ORDER — OXYCODONE HCL 20 MG/1
20 TABLET, FILM COATED, EXTENDED RELEASE ORAL EVERY 12 HOURS
Qty: 60 TAB | Refills: 0 | Status: SHIPPED | OUTPATIENT
Start: 2017-08-23 | End: 2017-09-22

## 2017-07-17 RX ORDER — OXYCODONE HYDROCHLORIDE 10 MG/1
10 TABLET ORAL
COMMUNITY
End: 2018-06-22 | Stop reason: SDUPTHER

## 2017-07-17 NOTE — PROGRESS NOTES
Nursing Notes    Patient presents to the office today in follow-up. Patient rates her pain at 5/10 on the numerical pain scale. Reviewed medications with counts as follows:    Rx Date filled Qty Dispensed Pill Count Last Dose Short   Oxycodone 10mg 06/25/17 90 34 today no   oxycontin CR 20mg 06/24/17 60 24 yesterday no                                  Comments:     POC UDS was not performed in office today    Any new labs or imaging since last appointment? NO    Have you been to an emergency room (ER) or urgent care clinic since your last visit? NO            Have you been hospitalized since your last visit? NO     If yes, where, when, and reason for visit? Have you seen or consulted any other health care providers outside of the [address] [place] [address]  since your last visit? NO     If yes, where, when, and reason for visit? HM deferred to pcp.

## 2017-07-17 NOTE — PATIENT INSTRUCTIONS
PLAN / Pt Instructions:  1. Continue current plan with no evidence of addiction or diversion. Stable on current medication without adverse events. 2. Refill Oxycontin  20 mg 2 times daily  3. Refill Oxycodone 10 mg up to 3 times daily as needed  4. Discussed risks of addiction, dependency, and opioid induced hyperalgesia.    5. Return to clinic in 2 months

## 2017-07-17 NOTE — MR AVS SNAPSHOT
Visit Information Date & Time Provider Department Dept. Phone Encounter #  
 7/17/2017 11:00 AM Jessy Palomares [address] [place] [address] for Pain Management [id number] Follow-up Instructions Return in about 2 months (around 9/17/2017). Upcoming Health Maintenance Date Due Hepatitis C Screening 1955 Pneumococcal 19-64 Medium Risk (1 of 1 - PPSV23) 1/22/1974 DTaP/Tdap/Td series (1 - Tdap) 1/22/1976 PAP AKA CERVICAL CYTOLOGY 1/22/1976 BREAST CANCER SCRN MAMMOGRAM 1/22/2005 FOBT Q 1 YEAR AGE 50-75 1/22/2005 ZOSTER VACCINE AGE 60> 1/22/2015 INFLUENZA AGE 9 TO ADULT 8/1/2017 Allergies as of 7/17/2017  Review Complete On: 7/17/2017 By: Kellee Bueno PA-C Severity Noted Reaction Type Reactions Prozac [Fluoxetine]  07/31/2013    Rash, Itching Current Immunizations  Never Reviewed No immunizations on file. Not reviewed this visit You Were Diagnosed With   
  
 Codes Comments Cervical stenosis of spinal canal     ICD-10-CM: M48.02 
ICD-9-CM: 723.0 Vitals BP Pulse Temp Resp Height(growth percentile) Weight(growth percentile) (!) 144/91 87 98.1 °F (36.7 °C) 12 5' 5\" (1.651 m) 105 lb (47.6 kg) BMI OB Status Smoking Status 17.47 kg/m2 Postmenopausal Current Every Day Smoker BMI and BSA Data Body Mass Index Body Surface Area  
 17.47 kg/m 2 1.48 m 2 Preferred Pharmacy Pharmacy Name Phone [facility] [place] [phone] [facility] [phone] Your Updated Medication List  
  
   
This list is accurate as of: 7/17/17 12:04 PM.  Always use your most recent med list.  
  
  
  
  
 aspirin 81 mg tablet Take 81 mg by mouth.  
  
 multivitamin tablet Commonly known as:  ONE A DAY Take 1 Tab by mouth daily. naloxone 4 mg/actuation Spry 4 mg by Nasal route as needed. * oxyCODONE ER 20 mg ER tablet Commonly known as:  OxyCONTIN Take 20 mg by mouth every twelve (12) hours. * oxyCODONE IR 10 mg Tab immediate release tablet Commonly known as:  Seldon Breslow Take  by mouth. * oxyCODONE ER 20 mg ER tablet Commonly known as:  OxyCONTIN Take 1 Tab by mouth every twelve (12) hours for 30 days. Max Daily Amount: 40 mg. Indications: Chronic Pain, Severe Pain Start taking on:  7/24/2017 * oxyCODONE IR 10 mg Tab immediate release tablet Commonly known as:  Seldon Breslow Take 1 Tab by mouth every eight (8) hours as needed for up to 30 days. Max Daily Amount: 30 mg. Indications: Pain Start taking on:  7/25/2017 * oxyCODONE ER 20 mg ER tablet Commonly known as:  OxyCONTIN Take 1 Tab by mouth every twelve (12) hours for 30 days. Max Daily Amount: 40 mg. Start taking on:  8/23/2017 * oxyCODONE IR 10 mg Tab immediate release tablet Commonly known as:  Seldon Breslow Take 1 Tab by mouth every eight (8) hours as needed for up to 30 days. Max Daily Amount: 30 mg. Start taking on:  8/24/2017 VITAMIN D3 1,000 unit Cap Generic drug:  cholecalciferol Take  by mouth. * Notice: This list has 6 medication(s) that are the same as other medications prescribed for you. Read the directions carefully, and ask your doctor or other care provider to review them with you. Prescriptions Printed Refills  
 oxyCODONE ER (OXYCONTIN) 20 mg ER tablet 0 Starting on: 8/23/2017 Sig: Take 1 Tab by mouth every twelve (12) hours for 30 days. Max Daily Amount: 40 mg.  
 Class: Print Route: Oral  
 oxyCODONE IR (ROXICODONE) 10 mg tab immediate release tablet 0 Starting on: 8/24/2017 Sig: Take 1 Tab by mouth every eight (8) hours as needed for up to 30 days. Max Daily Amount: 30 mg.  
 Class: Print Route: Oral  
 oxyCODONE ER (OXYCONTIN) 20 mg ER tablet 0 Starting on: 7/24/2017 Sig: Take 1 Tab by mouth every twelve (12) hours for 30 days.  Max Daily Amount: 40 mg. Indications: Chronic Pain, Severe Pain Class: Print Route: Oral  
 oxyCODONE IR (ROXICODONE) 10 mg tab immediate release tablet 0 Starting on: 7/25/2017 Sig: Take 1 Tab by mouth every eight (8) hours as needed for up to 30 days. Max Daily Amount: 30 mg. Indications: Pain Class: Print Route: Oral  
  
Follow-up Instructions Return in about 2 months (around 9/17/2017). Patient Instructions PLAN / Pt Instructions: 1. Continue current plan with no evidence of addiction or diversion. Stable on current medication without adverse events. 2. Refill Oxycontin  20 mg 2 times daily 3. Refill Oxycodone 10 mg up to 3 times daily as needed 4. Discussed risks of addiction, dependency, and opioid induced hyperalgesia. 5. Return to clinic in 2 months Introducing [facility] & HEALTH SERVICES! [employer] [place] [employer] introduces [employer] patient portal. Now you can access parts of your medical record, email your doctor's office, and request medication refills online. 1. In your internet browser, go to [email or web address] 2. Click on the First Time User? Click Here link in the Sign In box. You will see the New Member Sign Up page. 3. Enter your [employer] Access Code exactly as it appears below. You will not need to use this code after youve completed the sign-up process. If you do not sign up before the expiration date, you must request a new code. · [employer] Access Code: [email or web address] Expires: 8/14/2017  2:37 PM 
 
4. Enter the last four digits of your Social Security Number (xxxx) and Date of Birth (mm/dd/yyyy) as indicated and click Submit. You will be taken to the next sign-up page. 5. Create a [employer] ID. This will be your [employer] login ID and cannot be changed, so think of one that is secure and easy to remember. 6. Create a [employer] password. You can change your password at any time. 7. Enter your Password Reset Question and Answer.  This can be used at a later time if you forget your password. 8. Enter your e-mail address. You will receive e-mail notification when new information is available in [address] 9. Click Sign Up. You can now view and download portions of your medical record. 10. Click the Download Summary menu link to download a portable copy of your medical information. If you have questions, please visit the Frequently Asked Questions section of the [employer] website. Remember, [employer] is NOT to be used for urgent needs. For medical emergencies, dial 911. Now available from your iPhone and Android! Please provide this summary of care documentation to your next provider. Your primary care clinician is listed as NOT ON FILE. If you have any questions after today's visit, please call [phone].

## 2017-09-12 ENCOUNTER — OFFICE VISIT (OUTPATIENT)
Dept: PAIN MANAGEMENT | Age: 62
End: 2017-09-12

## 2017-09-12 VITALS
WEIGHT: 105 LBS | RESPIRATION RATE: 12 BRPM | HEIGHT: 65 IN | SYSTOLIC BLOOD PRESSURE: 158 MMHG | HEART RATE: 69 BPM | TEMPERATURE: 97 F | DIASTOLIC BLOOD PRESSURE: 84 MMHG | BODY MASS INDEX: 17.49 KG/M2

## 2017-09-12 DIAGNOSIS — M51.36 DDD (DEGENERATIVE DISC DISEASE), LUMBAR: Primary | ICD-10-CM

## 2017-09-12 DIAGNOSIS — M48.02 CERVICAL STENOSIS OF SPINAL CANAL: ICD-10-CM

## 2017-09-12 DIAGNOSIS — M54.5 CHRONIC MIDLINE LOW BACK PAIN, WITH SCIATICA PRESENCE UNSPECIFIED: ICD-10-CM

## 2017-09-12 DIAGNOSIS — M41.9 SCOLIOSIS, UNSPECIFIED SCOLIOSIS TYPE, UNSPECIFIED SPINAL REGION: ICD-10-CM

## 2017-09-12 DIAGNOSIS — G89.4 CHRONIC PAIN SYNDROME: ICD-10-CM

## 2017-09-12 DIAGNOSIS — M54.2 NECK PAIN: ICD-10-CM

## 2017-09-12 DIAGNOSIS — M15.9 PRIMARY OSTEOARTHRITIS INVOLVING MULTIPLE JOINTS: ICD-10-CM

## 2017-09-12 DIAGNOSIS — M43.10 SPONDYLOLISTHESIS, GRADE 1: ICD-10-CM

## 2017-09-12 DIAGNOSIS — G89.29 CHRONIC MIDLINE LOW BACK PAIN, WITH SCIATICA PRESENCE UNSPECIFIED: ICD-10-CM

## 2017-09-12 DIAGNOSIS — M50.30 DDD (DEGENERATIVE DISC DISEASE), CERVICAL: ICD-10-CM

## 2017-09-12 DIAGNOSIS — Z79.899 ENCOUNTER FOR LONG-TERM (CURRENT) USE OF HIGH-RISK MEDICATION: ICD-10-CM

## 2017-09-12 RX ORDER — OXYCODONE HYDROCHLORIDE 10 MG/1
10 TABLET ORAL
Qty: 90 TAB | Refills: 0 | Status: SHIPPED | OUTPATIENT
Start: 2017-09-24 | End: 2017-10-24

## 2017-09-12 RX ORDER — OXYCODONE HCL 20 MG/1
20 TABLET, FILM COATED, EXTENDED RELEASE ORAL EVERY 12 HOURS
Qty: 60 TAB | Refills: 0 | Status: SHIPPED | OUTPATIENT
Start: 2017-11-23 | End: 2017-12-13 | Stop reason: SDUPTHER

## 2017-09-12 RX ORDER — OXYCODONE HYDROCHLORIDE 10 MG/1
10 TABLET ORAL
Qty: 90 TAB | Refills: 0 | Status: SHIPPED | OUTPATIENT
Start: 2017-11-23 | End: 2017-12-13 | Stop reason: SDUPTHER

## 2017-09-12 RX ORDER — OXYCODONE HCL 20 MG/1
20 TABLET, FILM COATED, EXTENDED RELEASE ORAL EVERY 12 HOURS
Qty: 60 TAB | Refills: 0 | Status: SHIPPED | OUTPATIENT
Start: 2017-09-24 | End: 2017-10-24

## 2017-09-12 RX ORDER — OXYCODONE HYDROCHLORIDE 10 MG/1
10 TABLET ORAL
Qty: 90 TAB | Refills: 0 | Status: SHIPPED | OUTPATIENT
Start: 2017-10-24 | End: 2017-11-23

## 2017-09-12 RX ORDER — OXYCODONE HCL 20 MG/1
20 TABLET, FILM COATED, EXTENDED RELEASE ORAL EVERY 12 HOURS
Qty: 60 TAB | Refills: 0 | Status: SHIPPED | OUTPATIENT
Start: 2017-10-24 | End: 2017-11-23

## 2017-09-12 NOTE — PATIENT INSTRUCTIONS
PLAN / Pt Instructions:    1. Continue current plan with no evidence of addiction or diversion. 2. Stable on current medication without adverse events.    3. Refill Oxycontin  20 mg take one tablet 2 times daily  4. Refill Oxycodone 10 mg take one tablet up to 3 times daily as needed  5. Discussed risks of addiction, dependency, and opioid induced hyperalgesia.    6. Return to clinic in 3 months

## 2017-09-12 NOTE — PROGRESS NOTES
Nursing Notes    Patient presents to the office today in follow-up. Patient rates her pain at 4/10 on the numerical pain scale. Reviewed medications with counts as follows:    Rx Date filled Qty Dispensed Pill Count Last Dose Short   oxycontin CR 20mg 08/25/17 60 27 today no   Oxycodone 10mg 08/25/17 90 43 today no                                  Comments:     POC UDS was not performed in office today    Any new labs or imaging since last appointment? NO    Have you been to an emergency room (ER) or urgent care clinic since your last visit? NO            Have you been hospitalized since your last visit? NO     If yes, where, when, and reason for visit? Have you seen or consulted any other health care providers outside of the [address] [place] [address]  since your last visit? YES     If yes, where, when, and reason for visit? HM deferred to pcp.

## 2017-09-12 NOTE — PROGRESS NOTES
HISTORY OF PRESENT ILLNESS  Jono Quiros is a 58 y.o. female    HPI: Ms. Khoi Chase returns today for f/u of chronic low back pain and neck pain. No lumbar or neck surgery. Prior injections with no benefit. H/o physical therapy with little relief. PPMHx: of depression and anxiety      The patient denies any significant changes since last f/u. She reports having a recent bronchial/lung infection. She was evaluated and given antibiotics and it cleared up quickly. Her back and neck pain continues unchanged since last visit.  She reports that she will go visit her 80year old mother during the holidays and wanted to come back in 3 months for next visit.   She tolerates medications without side effects. Oksana Chase reports no change in sleep or constipation. No cpap.     She continues to complain of a stressful situation in her home with her friends son who she raised. Obed Angel is currently raising his daughter, Joan Mallory,  she is 6years old.      Current management consists of OxyContin 20 mg every 12 hours, and Oxycodone IR 10 mg 3 times a day as needed.  Valium 5 mg will now come from her PCP.  Medications are helping with pain control and quality of life. Her pain is 4/10 with medication and 8/10 without.  Pt describes pain as aching and stabbing. Aggravating factors include bending and lifting. Relieved with rest, medication, and avoiding painful activities. The patient reports 70% relief with current medications. Current treatment is helping to improve general activity, mood, walking, sleep, enjoyment of life      Measuring clinical outcomes of chronic pain patients: score 9/28; the lower the number the better the outcome.        Pain Meds and Quality Of Life have been reviewed. Nonpharmacologic therapy and non-opioid pharmacologic therapy were considered. If opioid therapy is prescribed, this is only if the expected benefits are anticipated to outweigh risks.      She  is otherwise doing well with no other complaints today. She denies any adverse events including nausea, vomiting, dizziness, increased constipation, hallucinations, or seizures. The patient reports functional improvement and QOL with pain medication. Vitals:    09/12/17 1242   BP: 158/84   Pulse: 69   Resp: 12   Temp: 97 °F (36.1 °C)   Weight: 47.6 kg (105 lb)   Height: 5' 5\" (1.651 m)   PainSc:   4   PainLoc: Back         Allergies   Allergen Reactions    Prozac [Fluoxetine] Rash and Itching       History reviewed. No pertinent surgical history. ROS   Constitutional: Positive for weight loss. Negative for chills, diaphoresis, fever and malaise/fatigue. 5-15 pounds over last two years   HENT: Positive for congestion. Negative for ear discharge, ear pain, hearing loss, nosebleeds and sore throat. Eyes: Negative for blurred vision, double vision, pain and discharge. Respiratory: Positive for cough. Negative for hemoptysis, shortness of breath and wheezing. Current smoker- cough   Cardiovascular: Negative for chest pain, palpitations and leg swelling. Gastrointestinal: Negative for constipation, diarrhea, heartburn, nausea and vomiting. Genitourinary: Negative for dysuria, frequency and urgency. Musculoskeletal: Positive for back pain and neck pain. Negative for falls, joint pain and myalgias. Skin: Negative for itching and rash. Neurological: Positive for weakness. Negative for dizziness, tingling, seizures, loss of consciousness and headaches. Psychiatric/Behavioral: Positive for depression. Negative for hallucinations and suicidal ideas. The patient is nervous/anxious and has insomnia. KASH and PTSD     Physical Exam   Constitutional: She is oriented to person, place, and time and well-developed, well-nourished, and in no distress. She appears healthy. Non-toxic appearance. No distress. HENT:   Head: Normocephalic and atraumatic.    Right Ear: External ear normal.   Left Ear: External ear normal. Nose: Nose normal.   Eyes: Lids are normal. Right eye exhibits no discharge. Left eye exhibits no discharge. Neck: Neck supple. Pulmonary/Chest: Effort normal.   Musculoskeletal:        Cervical back: She exhibits bony tenderness and pain. Lumbar back: She exhibits tenderness, bony tenderness and pain. Neurological: She is alert and oriented to person, place, and time. Gait normal. Coordination and gait normal.   Skin: Skin is warm and dry. She is not diaphoretic. No pallor. Psychiatric: Mood and affect normal.   Nursing note and vitals reviewed. ASSESSMENT:    1. DDD (degenerative disc disease), lumbar    2. DDD (degenerative disc disease), cervical    3. Spondylolisthesis, grade 1    4. Neck pain    5. Chronic midline low back pain, with sciatica presence unspecified    6. Chronic pain syndrome    7. Primary osteoarthritis involving multiple joints    8. Scoliosis, unspecified scoliosis type, unspecified spinal region    9. Cervical stenosis of spinal canal    10. Encounter for long-term (current) use of high-risk medication        [place] Prescription Monitoring Program was reviewed which does not demonstrate aberrancies and/or inconsistencies with regard to the historical prescribing of controlled medications to this patient by other providers. Medications were brought to visit today. Pill count was appropriate. When possible, non-drug therapy for chronic pain should be used as a first-line treatment. Physical therapy exercise regimens, chiropractic manipulation, meditation relaxation techniques, cognitive behavior therapy, acupuncture, yoga, Shaji Chi,  transcutaneous electrical nerve stimulation (TENS), and application of moist heat can help alleviate pain .      Explained that realistic expectations and goals with chronic pain management are to maximize function and minimize pain with the understanding that limitations will exist both in the extent of relief that she may achieve, as well as thresholds of mg strengths that we will not exceed. Our role is to help the patient better cope with chronic pain utilizng a multimodal approach. The patients condition and plan were discussed. All questions were answered. The patient agrees with the plan. PLAN / Pt Instructions:    1. Continue current plan with no evidence of addiction or diversion. 2. Stable on current medication without adverse events.    3. Refill Oxycontin IR  20 mg take one tablet 2 times daily  4. Refill Oxycodone IR 10 mg take one tablet up to 3 times daily as needed  5. Discussed risks of addiction, dependency, and opioid induced hyperalgesia. 6. Return to clinic in 3 months    Prescription monitoring program reviewed. Pain medications prescribed with the objective of pain relief and improved physical and psychosocial function in this patient. DISPOSITION  · Counseled patient on proper use of prescribed medications. · Counseled patient about chronic medical conditions and their relationship to anxiety and depression and recommended mental health support as needed. · Reviewed with patient self-help tools, home exercise, and lifestyle changes to assist the patient in self-management of symptoms. · Reviewed with patient the treatment plan, goals of treatment plan, and limitations of treatment plan, to include the potential for side effects from medications and procedures. If side effects occur, it is the responsibility of the patient to inform the clinic so that a change in the treatment plan can be made in a safe manner. The patient is advised that stopping prescribed medication may cause an increase in symptoms and possible medication withdrawal symptoms. The patient is informed an emergency room evaluation may be necessary if this occurs.       Spent 25 minutes with patient today reviewing the treatment plan, goals of treatment plan, and limitations of the treatment plan, to include the potential for side effects from medications and procedures. Caleb Moore PA-C 9/12/2017        Note: Please excuse any typographical errors. Voice recognition software was used for this note and may cause mistakes.

## 2017-12-13 ENCOUNTER — OFFICE VISIT (OUTPATIENT)
Dept: PAIN MANAGEMENT | Age: 62
End: 2017-12-13

## 2017-12-13 VITALS
RESPIRATION RATE: 20 BRPM | SYSTOLIC BLOOD PRESSURE: 140 MMHG | HEART RATE: 82 BPM | TEMPERATURE: 97.6 F | DIASTOLIC BLOOD PRESSURE: 73 MMHG | WEIGHT: 105 LBS | BODY MASS INDEX: 17.47 KG/M2

## 2017-12-13 DIAGNOSIS — F41.1 GAD (GENERALIZED ANXIETY DISORDER): ICD-10-CM

## 2017-12-13 DIAGNOSIS — M48.02 CERVICAL STENOSIS OF SPINAL CANAL: ICD-10-CM

## 2017-12-13 DIAGNOSIS — M54.5 CHRONIC MIDLINE LOW BACK PAIN, WITH SCIATICA PRESENCE UNSPECIFIED: ICD-10-CM

## 2017-12-13 DIAGNOSIS — M47.812 CERVICAL SPONDYLOSIS WITHOUT MYELOPATHY: ICD-10-CM

## 2017-12-13 DIAGNOSIS — Z79.899 ENCOUNTER FOR LONG-TERM (CURRENT) USE OF HIGH-RISK MEDICATION: ICD-10-CM

## 2017-12-13 DIAGNOSIS — M54.2 NECK PAIN: Primary | ICD-10-CM

## 2017-12-13 DIAGNOSIS — M41.9 SCOLIOSIS, UNSPECIFIED SCOLIOSIS TYPE, UNSPECIFIED SPINAL REGION: ICD-10-CM

## 2017-12-13 DIAGNOSIS — M50.30 DDD (DEGENERATIVE DISC DISEASE), CERVICAL: ICD-10-CM

## 2017-12-13 DIAGNOSIS — M62.838 MUSCLE SPASMS OF BOTH LOWER EXTREMITIES: ICD-10-CM

## 2017-12-13 DIAGNOSIS — G89.29 CHRONIC MIDLINE LOW BACK PAIN, WITH SCIATICA PRESENCE UNSPECIFIED: ICD-10-CM

## 2017-12-13 DIAGNOSIS — M43.10 SPONDYLOLISTHESIS, GRADE 1: ICD-10-CM

## 2017-12-13 DIAGNOSIS — M47.817 LUMBOSACRAL SPONDYLOSIS WITHOUT MYELOPATHY: ICD-10-CM

## 2017-12-13 DIAGNOSIS — F43.10 PTSD (POST-TRAUMATIC STRESS DISORDER): ICD-10-CM

## 2017-12-13 DIAGNOSIS — G89.4 CHRONIC PAIN SYNDROME: ICD-10-CM

## 2017-12-13 DIAGNOSIS — M51.36 DDD (DEGENERATIVE DISC DISEASE), LUMBAR: ICD-10-CM

## 2017-12-13 LAB
ALCOHOL UR POC: NORMAL
AMPHETAMINES UR POC: NEGATIVE
BARBITURATES UR POC: NEGATIVE
BENZODIAZEPINES UR POC: NEGATIVE
BUPRENORPHINE UR POC: NORMAL
CANNABINOIDS UR POC: NEGATIVE
CARISOPRODOL UR POC: NORMAL
COCAINE UR POC: NEGATIVE
FENTANYL UR POC: NORMAL
MDMA/ECSTASY UR POC: NEGATIVE
METHADONE UR POC: NEGATIVE
METHAMPHETAMINE UR POC: NEGATIVE
METHYLPHENIDATE UR POC: NEGATIVE
OPIATES UR POC: NEGATIVE
OXYCODONE UR POC: NORMAL
PHENCYCLIDINE UR POC: NORMAL
PROPOXYPHENE UR POC: NORMAL
TRAMADOL UR POC: NORMAL
TRICYCLICS UR POC: NEGATIVE

## 2017-12-13 RX ORDER — OXYCODONE HYDROCHLORIDE 10 MG/1
10 TABLET ORAL
Qty: 90 TAB | Refills: 0 | Status: SHIPPED | OUTPATIENT
Start: 2018-02-11 | End: 2018-03-15 | Stop reason: SDUPTHER

## 2017-12-13 RX ORDER — OXYCODONE HYDROCHLORIDE 10 MG/1
10 TABLET ORAL
Qty: 90 TAB | Refills: 0 | Status: SHIPPED | OUTPATIENT
Start: 2018-01-12 | End: 2017-12-13 | Stop reason: SDUPTHER

## 2017-12-13 RX ORDER — OXYCODONE HCL 20 MG/1
20 TABLET, FILM COATED, EXTENDED RELEASE ORAL EVERY 12 HOURS
Qty: 60 TAB | Refills: 0 | Status: SHIPPED | OUTPATIENT
Start: 2017-12-13 | End: 2017-12-13 | Stop reason: SDUPTHER

## 2017-12-13 RX ORDER — OXYCODONE HCL 20 MG/1
20 TABLET, FILM COATED, EXTENDED RELEASE ORAL EVERY 12 HOURS
Qty: 60 TAB | Refills: 0 | Status: SHIPPED | OUTPATIENT
Start: 2018-02-11 | End: 2018-03-15 | Stop reason: SDUPTHER

## 2017-12-13 RX ORDER — OXYCODONE HYDROCHLORIDE 10 MG/1
10 TABLET ORAL
Qty: 90 TAB | Refills: 0 | Status: SHIPPED | OUTPATIENT
Start: 2017-12-13 | End: 2017-12-13 | Stop reason: SDUPTHER

## 2017-12-13 RX ORDER — OXYCODONE HCL 20 MG/1
20 TABLET, FILM COATED, EXTENDED RELEASE ORAL EVERY 12 HOURS
Qty: 60 TAB | Refills: 0 | Status: SHIPPED | OUTPATIENT
Start: 2018-01-12 | End: 2017-12-13 | Stop reason: SDUPTHER

## 2017-12-13 NOTE — MR AVS SNAPSHOT
Visit Information Date & Time Provider Department Dept. Phone Encounter #  
 12/13/2017  2:45 PM Puneet Bai MD [address] [place] [address] Pain Management [id number] Follow-up Instructions Return in about 3 months (around 3/13/2018). Follow-up and Disposition History Upcoming Health Maintenance Date Due Hepatitis C Screening 1955 Pneumococcal 19-64 Medium Risk (1 of 1 - PPSV23) 1/22/1974 DTaP/Tdap/Td series (1 - Tdap) 1/22/1976 PAP AKA CERVICAL CYTOLOGY 1/22/1976 FOBT Q 1 YEAR AGE 50-75 1/22/2005 ZOSTER VACCINE AGE 60> 11/22/2014 Influenza Age 5 to Adult 8/1/2017 Allergies as of 12/13/2017  Review Complete On: 12/13/2017 By: Puneet Bai MD  
  
 Severity Noted Reaction Type Reactions Prozac [Fluoxetine]  07/31/2013    Rash, Itching Current Immunizations  Never Reviewed No immunizations on file. Not reviewed this visit You Were Diagnosed With   
  
 Codes Comments Neck pain    -  Primary ICD-10-CM: M54.2 ICD-9-CM: 723.1 Encounter for long-term (current) use of high-risk medication     ICD-10-CM: Z79.899 ICD-9-CM: V58.69 Cervical stenosis of spinal canal     ICD-10-CM: M48.02 
ICD-9-CM: 723.0 Chronic midline low back pain, with sciatica presence unspecified     ICD-10-CM: M54.5, G89.29 ICD-9-CM: 724.2, 338.29 Chronic pain syndrome     ICD-10-CM: G89.4 ICD-9-CM: 338.4 KASH (generalized anxiety disorder)     ICD-10-CM: F41.1 ICD-9-CM: 300.02 PTSD (post-traumatic stress disorder)     ICD-10-CM: F43.10 ICD-9-CM: 309.81 Spondylolisthesis, grade 1     ICD-10-CM: M43.10 ICD-9-CM: 738.4 DDD (degenerative disc disease), lumbar     ICD-10-CM: M51.36 
ICD-9-CM: 722.52 Lumbosacral spondylosis without myelopathy     ICD-10-CM: U73.288 ICD-9-CM: 721.3 DDD (degenerative disc disease), cervical     ICD-10-CM: M50.30 ICD-9-CM: 722.4 Cervical spondylosis without myelopathy     ICD-10-CM: T57.330 ICD-9-CM: 721.0 Scoliosis, unspecified scoliosis type, unspecified spinal region     ICD-10-CM: M41.9 ICD-9-CM: 737.30 Muscle spasms of both lower extremities     ICD-10-CM: Q91.317 ICD-9-CM: 728.85 Vitals BP Pulse Temp Resp Weight(growth percentile) BMI  
 140/73 82 97.6 °F (36.4 °C) 20 105 lb (47.6 kg) 17.47 kg/m2 OB Status Smoking Status Postmenopausal Current Every Day Smoker Vitals History BMI and BSA Data Body Mass Index Body Surface Area  
 17.47 kg/m 2 1.48 m 2 Preferred Pharmacy Pharmacy Name Phone [address] [phone] Your Updated Medication List  
  
   
This list is accurate as of: 12/13/17  3:28 PM.  Always use your most recent med list.  
  
  
  
  
 aspirin 81 mg tablet Take 81 mg by mouth.  
  
 multivitamin tablet Commonly known as:  ONE A DAY Take 1 Tab by mouth daily. naloxone 4 mg/actuation nasal spray Commonly known as:  NARCAN  
4 mg by Nasal route as needed. * oxyCODONE ER 20 mg ER tablet Commonly known as:  OxyCONTIN Take 20 mg by mouth every twelve (12) hours. * oxyCODONE IR 10 mg Tab immediate release tablet Commonly known as:  Brandon Rattler Take  by mouth. * oxyCODONE IR 10 mg Tab immediate release tablet Commonly known as:  [place] Rattler Take 1 Tab by mouth every eight (8) hours as needed for up to 30 days. Max Daily Amount: 30 mg. Start taking on:  2/11/2018 * oxyCODONE ER 20 mg ER tablet Commonly known as:  OxyCONTIN Take 1 Tab by mouth every twelve (12) hours for 30 days. Max Daily Amount: 40 mg. Indications: Chronic Pain, Severe Pain Start taking on:  2/11/2018 VITAMIN D3 1,000 unit Cap Generic drug:  cholecalciferol Take  by mouth. * Notice:   This list has 4 medication(s) that are the same as other medications prescribed for you. Read the directions carefully, and ask your doctor or other care provider to review them with you. Prescriptions Printed Refills  
 oxyCODONE IR (ROXICODONE) 10 mg tab immediate release tablet 0 Starting on: 2/11/2018 Sig: Take 1 Tab by mouth every eight (8) hours as needed for up to 30 days. Max Daily Amount: 30 mg.  
 Class: Print Route: Oral  
 oxyCODONE ER (OXYCONTIN) 20 mg ER tablet 0 Starting on: 2/11/2018 Sig: Take 1 Tab by mouth every twelve (12) hours for 30 days. Max Daily Amount: 40 mg. Indications: Chronic Pain, Severe Pain Class: Print Route: Oral  
  
We Performed the Following AMB POC DRUG SCREEN () [ HCP] DRUG SCREEN [HVC50608 Custom] Follow-up Instructions Return in about 3 months (around 3/13/2018). Introducing [facility]! Jordyn Ledbetter introduces [employer] patient portal. Now you can access parts of your medical record, email your doctor's office, and request medication refills online. 1. In your internet browser, go to [email or web address] 2. Click on the First Time User? Click Here link in the Sign In box. You will see the New Member Sign Up page. 3. Enter your [employer] Access Code exactly as it appears below. You will not need to use this code after youve completed the sign-up process. If you do not sign up before the expiration date, you must request a new code. · [employer] Access Code: [email or web address] Expires: 3/13/2018  3:28 PM 
 
4. Enter the last four digits of your Social Security Number (xxxx) and Date of Birth (mm/dd/yyyy) as indicated and click Submit. You will be taken to the next sign-up page. 5. Create a [employer] ID. This will be your [employer] login ID and cannot be changed, so think of one that is secure and easy to remember. 6. Create a [employer] password. You can change your password at any time. 7. Enter your Password Reset Question and Answer. This can be used at a later time if you forget your password. 8. Enter your e-mail address. You will receive e-mail notification when new information is available in [address] 9. Click Sign Up. You can now view and download portions of your medical record. 10. Click the Download Summary menu link to download a portable copy of your medical information. If you have questions, please visit the Frequently Asked Questions section of the [facility] website. Remember, [facility] is NOT to be used for urgent needs. For medical emergencies, dial 911. Now available from your iPhone and Android! Please provide this summary of care documentation to your next provider. Your primary care clinician is listed as NOT ON FILE. If you have any questions after today's visit, please call [phone].

## 2017-12-13 NOTE — PROGRESS NOTES
Nursing Notes    Patient presents to the office today in follow-up. Patient rates her pain at 5/10 on the numerical pain scale. Reviewed medications with counts as follows:    Rx Date filled Qty Dispensed Pill Count Last Dose Short   oxycontin 20mg 11/24/17 60 26 This am  No    Oxycodone 10mg 11/24/17 90 36 This am  No                                   Comments:     POC UDS was performed in office today per verbal order of provider (GS) ; rbv'd. Any new labs or imaging since last appointment? NO    Have you been to an emergency room (ER) or urgent care clinic since your last visit? NO            Have you been hospitalized since your last visit? NO     If yes, where, when, and reason for visit? Have you seen or consulted any other health care providers outside of the [address] [place] [address]  since your last visit? YES     If yes, where, when, and reason for visit? pcp       HM deferred to pcp.

## 2017-12-13 NOTE — PROGRESS NOTES
HISTORY OF PRESENT ILLNESS  Maribel Russo is a 58 y.o. female. HPI Comments: Visit survey reviewed  Chief complaint- chronic pain syndrome- neck pain, back pain  Medication continues to be helpful  She will continue with OxyContin 20 mg twice a day  Oxycodone 10 mg 3 times a day as needed    No new significant side effects reported  Medication continues to help improve quality of life. Medications reviewed including risk and benefits. Recent average level of pain-5    Measurement of clinical outcome for chronic pain patient/ SPAASMS Score Card-            Information reviewed and will be scanned. Total score today-7      Review of Systems   Constitutional: Negative for chills and fever. HENT: Negative for congestion, ear discharge, ear pain and sore throat. Eyes: Negative for pain and discharge. Respiratory: Negative for hemoptysis and wheezing. Cardiovascular: Negative for chest pain and palpitations. Gastrointestinal: Negative for blood in stool, constipation, heartburn, melena and nausea. Genitourinary: Negative for dysuria and hematuria. Musculoskeletal: Positive for back pain, myalgias and neck pain. Skin: Negative for itching. Neurological: Negative for seizures and loss of consciousness. Psychiatric/Behavioral: Positive for depression. Negative for hallucinations, substance abuse and suicidal ideas. The patient is nervous/anxious. Physical Exam   Constitutional: She appears well-developed and well-nourished. She is cooperative. She does not have a sickly appearance. HENT:   Head: Normocephalic and atraumatic. Right Ear: External ear normal. No drainage. Left Ear: External ear normal. No drainage. Nose: Nose normal.   Eyes: Lids are normal. Right eye exhibits no discharge. Left eye exhibits no discharge. Right conjunctiva has no hemorrhage. Left conjunctiva has no hemorrhage. Neck: Neck supple. No tracheal deviation present. No thyroid mass present. Pulmonary/Chest: Effort normal. No respiratory distress. Neurological: She is alert. No cranial nerve deficit. Skin: Skin is intact. No rash noted. Psychiatric: Her speech is normal. Her affect is not angry. She does not express inappropriate judgment. Nursing note and vitals reviewed. ASSESSMENT and PLAN  Encounter Diagnoses   Name Primary?  Neck pain Yes    Encounter for long-term (current) use of high-risk medication     Cervical stenosis of spinal canal     Chronic midline low back pain, with sciatica presence unspecified     Chronic pain syndrome     KASH (generalized anxiety disorder)     PTSD (post-traumatic stress disorder)     Spondylolisthesis, grade 1     DDD (degenerative disc disease), lumbar     Lumbosacral spondylosis without myelopathy     DDD (degenerative disc disease), cervical     Cervical spondylosis without myelopathy     Scoliosis, unspecified scoliosis type, unspecified spinal region     Muscle spasms of both lower extremities    No indicators for recent medication misuse.  reviewed. Pain Meds and Quality Of Life have been reviewed. Nonpharmacologic therapy and non-opioid pharmacologic therapy were considered. If opioid therapy is prescribed, this is only if the expected benefits are anticipated to outweigh risks. Possible changes to treatment plan considered. Support/education given as needed. Today-medications are as listed. No significant changes to medications. Follow up -- 3 months. She prefers a 3 month follow-up  Urine test today  The patient was informed that moving forward, I will no longer be with this clinic. They were reminded that they can continue care with this clinic and I did request a follow-up for the patient with this clinic. They will also receive a list of other pain physicians/clinics in the area in case they are interested.

## 2018-03-15 ENCOUNTER — OFFICE VISIT (OUTPATIENT)
Dept: PAIN MANAGEMENT | Age: 63
End: 2018-03-15

## 2018-03-15 VITALS
HEIGHT: 65 IN | RESPIRATION RATE: 16 BRPM | SYSTOLIC BLOOD PRESSURE: 134 MMHG | WEIGHT: 105 LBS | BODY MASS INDEX: 17.49 KG/M2 | DIASTOLIC BLOOD PRESSURE: 82 MMHG | TEMPERATURE: 97.1 F | HEART RATE: 71 BPM

## 2018-03-15 DIAGNOSIS — M62.838 MUSCLE SPASMS OF BOTH LOWER EXTREMITIES: ICD-10-CM

## 2018-03-15 DIAGNOSIS — M15.9 PRIMARY OSTEOARTHRITIS INVOLVING MULTIPLE JOINTS: ICD-10-CM

## 2018-03-15 DIAGNOSIS — M41.9 SCOLIOSIS, UNSPECIFIED SCOLIOSIS TYPE, UNSPECIFIED SPINAL REGION: ICD-10-CM

## 2018-03-15 DIAGNOSIS — M48.02 CERVICAL STENOSIS OF SPINAL CANAL: ICD-10-CM

## 2018-03-15 DIAGNOSIS — M54.2 NECK PAIN: ICD-10-CM

## 2018-03-15 DIAGNOSIS — M47.812 CERVICAL SPONDYLOSIS WITHOUT MYELOPATHY: ICD-10-CM

## 2018-03-15 DIAGNOSIS — M51.36 DDD (DEGENERATIVE DISC DISEASE), LUMBAR: Primary | ICD-10-CM

## 2018-03-15 DIAGNOSIS — M50.30 DDD (DEGENERATIVE DISC DISEASE), CERVICAL: ICD-10-CM

## 2018-03-15 DIAGNOSIS — M47.817 LUMBOSACRAL SPONDYLOSIS WITHOUT MYELOPATHY: ICD-10-CM

## 2018-03-15 DIAGNOSIS — Z79.899 ENCOUNTER FOR LONG-TERM (CURRENT) USE OF HIGH-RISK MEDICATION: ICD-10-CM

## 2018-03-15 DIAGNOSIS — G89.4 CHRONIC PAIN SYNDROME: ICD-10-CM

## 2018-03-15 RX ORDER — OXYCODONE HCL 20 MG/1
20 TABLET, FILM COATED, EXTENDED RELEASE ORAL EVERY 12 HOURS
Qty: 60 TAB | Refills: 0 | Status: SHIPPED | OUTPATIENT
Start: 2018-04-18 | End: 2018-05-18

## 2018-03-15 RX ORDER — OXYCODONE HYDROCHLORIDE 10 MG/1
10 TABLET ORAL
Qty: 90 TAB | Refills: 0 | Status: SHIPPED | OUTPATIENT
Start: 2018-03-19 | End: 2018-04-18

## 2018-03-15 RX ORDER — OXYCODONE HYDROCHLORIDE 10 MG/1
10 TABLET ORAL
Qty: 90 TAB | Refills: 0 | Status: SHIPPED | OUTPATIENT
Start: 2018-05-17 | End: 2018-06-16

## 2018-03-15 RX ORDER — OXYCODONE HCL 20 MG/1
20 TABLET, FILM COATED, EXTENDED RELEASE ORAL EVERY 12 HOURS
Qty: 60 TAB | Refills: 0 | Status: SHIPPED | OUTPATIENT
Start: 2018-03-19 | End: 2018-04-18

## 2018-03-15 RX ORDER — OXYCODONE HCL 20 MG/1
20 TABLET, FILM COATED, EXTENDED RELEASE ORAL EVERY 12 HOURS
Qty: 60 TAB | Refills: 0 | Status: SHIPPED | OUTPATIENT
Start: 2018-05-17 | End: 2018-06-16

## 2018-03-15 RX ORDER — OXYCODONE HYDROCHLORIDE 10 MG/1
10 TABLET ORAL
Qty: 90 TAB | Refills: 0 | Status: SHIPPED | OUTPATIENT
Start: 2018-04-18 | End: 2018-05-18

## 2018-03-15 NOTE — MR AVS SNAPSHOT
[address] [address] 
[phone] Patient: Rustam Gross MRN: [id number] TRC:[birth year]85 Visit Information Date & Time Provider Department Dept. Phone Encounter #  
 3/15/2018 12:20 PM [facility] [phone] Upcoming Health Maintenance Date Due Hepatitis C Screening 1955 Pneumococcal 19-64 Medium Risk (1 of 1 - PPSV23) 1/22/1974 DTaP/Tdap/Td series (1 - Tdap) 1/22/1976 PAP AKA CERVICAL CYTOLOGY 1/22/1976 BREAST CANCER SCRN MAMMOGRAM 1/22/2005 FOBT Q 1 YEAR AGE 50-75 1/22/2005 ZOSTER VACCINE AGE 60> 11/22/2014 Influenza Age 5 to Adult 8/1/2017 Allergies as of 3/15/2018  Review Complete On: 3/15/2018 By: Harman Che RN Severity Noted Reaction Type Reactions Prozac [Fluoxetine]  07/31/2013    Rash, Itching Current Immunizations  Never Reviewed No immunizations on file. Not reviewed this visit You Were Diagnosed With   
  
 Codes Comments DDD (degenerative disc disease), lumbar    -  Primary ICD-10-CM: M51.36 
ICD-9-CM: 722.52   
 DDD (degenerative disc disease), cervical     ICD-10-CM: M50.30 ICD-9-CM: 722.4 Primary osteoarthritis involving multiple joints     ICD-10-CM: M15.0 ICD-9-CM: 715.09 Chronic pain syndrome     ICD-10-CM: G89.4 ICD-9-CM: 338.4 Lumbosacral spondylosis without myelopathy     ICD-10-CM: L13.261 ICD-9-CM: 721.3 Muscle spasms of both lower extremities     ICD-10-CM: H64.789 ICD-9-CM: 728.85 Neck pain     ICD-10-CM: M54.2 ICD-9-CM: 723.1 Scoliosis, unspecified scoliosis type, unspecified spinal region     ICD-10-CM: M41.9 ICD-9-CM: 737.30 Cervical spondylosis without myelopathy     ICD-10-CM: D86.975 ICD-9-CM: 721.0 Encounter for long-term (current) use of high-risk medication     ICD-10-CM: Z79.899 ICD-9-CM: V58.69 Cervical stenosis of spinal canal     ICD-10-CM: M48.02 
ICD-9-CM: 723.0 Vitals BP Pulse Temp Resp Height(growth percentile) Weight(growth percentile) 134/82 (BP 1 Location: Right arm, BP Patient Position: Sitting) 71 97.1 °F (36.2 °C) (Oral) 16 5' 5\" (1.651 m) 105 lb (47.6 kg) BMI OB Status Smoking Status 17.47 kg/m2 Postmenopausal Current Every Day Smoker Vitals History BMI and BSA Data Body Mass Index Body Surface Area  
 17.47 kg/m 2 1.48 m 2 Preferred Pharmacy Pharmacy Name Phone [address] [facility] [address] [facility] [phone] Your Updated Medication List  
  
   
This list is accurate as of 3/15/18 12:59 PM.  Always use your most recent med list.  
  
  
  
  
 aspirin 81 mg tablet Take 81 mg by mouth.  
  
 multivitamin tablet Commonly known as:  ONE A DAY Take 1 Tab by mouth daily. naloxone 4 mg/actuation nasal spray Commonly known as:  NARCAN  
4 mg by Nasal route as needed. * oxyCODONE ER 20 mg ER tablet Commonly known as:  OxyCONTIN Take 20 mg by mouth every twelve (12) hours. * oxyCODONE IR 10 mg Tab immediate release tablet Commonly known as:  Dariela Snellen Take  by mouth. * oxyCODONE IR 10 mg Tab immediate release tablet Commonly known as:  Dariela Snellen Take 1 Tab by mouth every eight (8) hours as needed for up to 30 days. Max Daily Amount: 30 mg. Start taking on:  3/19/2018 * oxyCODONE ER 20 mg ER tablet Commonly known as:  OxyCONTIN Take 1 Tab by mouth every twelve (12) hours for 30 days. Max Daily Amount: 40 mg. Indications: Chronic Pain, Severe Pain Start taking on:  3/19/2018 * oxyCODONE IR 10 mg Tab immediate release tablet Commonly known as:  Dariela Snellen Take 1 Tab by mouth every eight (8) hours as needed for up to 30 days. Max Daily Amount: 30 mg. Start taking on:  4/18/2018 * oxyCODONE ER 20 mg ER tablet Commonly known as:  OxyCONTIN Take 1 Tab by mouth every twelve (12) hours for 30 days. Max Daily Amount: 40 mg. Indications: Chronic Pain, Severe Pain Start taking on:  4/18/2018 * oxyCODONE IR 10 mg Tab immediate release tablet Commonly known as:  Sohail Livings Take 1 Tab by mouth every eight (8) hours as needed for up to 30 days. Max Daily Amount: 30 mg. Start taking on:  5/17/2018 * oxyCODONE ER 20 mg ER tablet Commonly known as:  OxyCONTIN Take 1 Tab by mouth every twelve (12) hours for 30 days. Max Daily Amount: 40 mg. Indications: Chronic Pain, Severe Pain Start taking on:  5/17/2018 VITAMIN D3 1,000 unit Cap Generic drug:  cholecalciferol Take  by mouth. * Notice: This list has 8 medication(s) that are the same as other medications prescribed for you. Read the directions carefully, and ask your doctor or other care provider to review them with you. Prescriptions Printed Refills  
 oxyCODONE IR (ROXICODONE) 10 mg tab immediate release tablet 0 Starting on: 5/17/2018 Sig: Take 1 Tab by mouth every eight (8) hours as needed for up to 30 days. Max Daily Amount: 30 mg.  
 Class: Print Route: Oral  
 oxyCODONE ER (OXYCONTIN) 20 mg ER tablet 0 Starting on: 5/17/2018 Sig: Take 1 Tab by mouth every twelve (12) hours for 30 days. Max Daily Amount: 40 mg. Indications: Chronic Pain, Severe Pain Class: Print Route: Oral  
 oxyCODONE IR (ROXICODONE) 10 mg tab immediate release tablet 0 Starting on: 4/18/2018 Sig: Take 1 Tab by mouth every eight (8) hours as needed for up to 30 days. Max Daily Amount: 30 mg.  
 Class: Print Route: Oral  
 oxyCODONE ER (OXYCONTIN) 20 mg ER tablet 0 Starting on: 4/18/2018 Sig: Take 1 Tab by mouth every twelve (12) hours for 30 days. Max Daily Amount: 40 mg. Indications: Chronic Pain, Severe Pain Class: Print  Route: Oral  
 oxyCODONE IR (ROXICODONE) 10 mg tab immediate release tablet 0  
 Starting on: 3/19/2018 Sig: Take 1 Tab by mouth every eight (8) hours as needed for up to 30 days. Max Daily Amount: 30 mg.  
 Class: Print Route: Oral  
 oxyCODONE ER (OXYCONTIN) 20 mg ER tablet 0 Starting on: 3/19/2018 Sig: Take 1 Tab by mouth every twelve (12) hours for 30 days. Max Daily Amount: 40 mg. Indications: Chronic Pain, Severe Pain Class: Print Route: Oral  
  
Introducing [facility]! [facility] introduces [employer] patient portal. Now you can access parts of your medical record, email your doctor's office, and request medication refills online. 1. In your internet browser, go to [email or web address] 2. Click on the First Time User? Click Here link in the Sign In box. You will see the New Member Sign Up page. 3. Enter your Hylete Access Code exactly as it appears below. You will not need to use this code after youve completed the sign-up process. If you do not sign up before the expiration date, you must request a new code. · Hylete Access Code: [email or web address] Expires: 6/13/2018 12:59 PM 
 
4. Enter the last four digits of your Social Security Number (xxxx) and Date of Birth (mm/dd/yyyy) as indicated and click Submit. You will be taken to the next sign-up page. 5. Create a Hylete ID. This will be your Hylete login ID and cannot be changed, so think of one that is secure and easy to remember. 6. Create a H[employer]te password. You can change your password at any time. 7. Enter your Password Reset Question and Answer. This can be used at a later time if you forget your password. 8. Enter your e-mail address. You will receive e-mail notification when new information is available in [address] 9. Click Sign Up. You can now view and download portions of your medical record. 10. Click the Download Summary menu link to download a portable copy of your medical information.  
 
If you have questions, please visit the Frequently Asked Questions section of the [employer]. Remember, [employer] is NOT to be used for urgent needs. For medical emergencies, dial 911. Now available from your iPhone and Android! Please provide this summary of care documentation to your next provider. Your primary care clinician is listed as NOT ON FILE. If you have any questions after today's visit, please call [phone].

## 2018-03-15 NOTE — PROGRESS NOTES
HISTORY OF PRESENT ILLNESS  Brian Farnsworth is a 61 y.o. female    Ms. Lang Pulling  returns today for f/u of chronic low back pain and neck pain. No lumbar or neck surgery. Prior injections with no benefit. H/o physical therapy with little relief.  PPMHx: of depression and anxiety       The patient denies any significant changes in her chronic pain since last f/u.  Her biggest pain generator is her back and neck pain.  She tolerates medications without side effects. Oksana Rubio reports no change in sleep or constipation. No cpap. She continues to complain of a stressful situation in her home with her friends son who she raised. Jina Ivey is currently raising his daughter, Marta Heller,  she is 5years old.       Current management consists of OxyContin 20 mg every 12 hours, and Oxycodone IR 10 mg 3 times a day as needed.  Valium 5 mg will now come from her PCP. Medications are helping with pain control and quality of life. Her pain is 4/10 with medication and 8/10 without.  Pt describes pain as aching and stabbing. Aggravating factors include bending and lifting. Relieved with rest, medication, and avoiding painful activities. The patient reports 70% relief with current medications. Current treatment is helping to improve general activity, mood, walking, sleep, enjoyment of life      Measuring clinical outcomes of chronic pain patients: score ; the lower the number the better the outcome. Pain Meds and Quality Of Life have been reviewed. Nonpharmacologic therapy and non-opioid pharmacologic therapy were considered. If opioid therapy is prescribed, this is only if the expected benefits are anticipated to outweigh risks. She  is otherwise doing well with no other complaints today. She denies any adverse events including nausea, vomiting, dizziness, increased constipation, hallucinations, or seizures. The patient reports functional improvement and QOL with pain medication.        Vitals:    03/15/18 1221   BP: 134/82   Pulse: 71   Resp: 16   Temp: 97.1 °F (36.2 °C)   TempSrc: Oral   Weight: 47.6 kg (105 lb)   Height: 5' 5\" (1.651 m)   PainSc:   5   PainLoc: Back         Allergies   Allergen Reactions    Prozac [Fluoxetine] Rash and Itching       History reviewed. No pertinent surgical history. ROS   Constitutional: Positive for weight loss. Negative for chills, diaphoresis, fever and malaise/fatigue.        5-15 pounds over last two years   HENT: Positive for congestion. Negative for ear discharge, ear pain, hearing loss, nosebleeds and sore throat.    Eyes: Negative for blurred vision, double vision, pain and discharge. Respiratory: Positive for cough. Negative for hemoptysis, shortness of breath and wheezing.         Current smoker- cough   Cardiovascular: Negative for chest pain, palpitations and leg swelling. Gastrointestinal: Negative for constipation, diarrhea, heartburn, nausea and vomiting. Genitourinary: Negative for dysuria, frequency and urgency. Musculoskeletal: Positive for back pain and neck pain. Negative for falls, joint pain and myalgias. Skin: Negative for itching and rash. Neurological: Positive for weakness. Negative for dizziness, tingling, seizures, loss of consciousness and headaches. Psychiatric/Behavioral: Positive for depression. Negative for hallucinations and suicidal ideas. The patient is nervous/anxious and has insomnia.         KASH and PTSD     Physical Exam   Constitutional: She is oriented to person, place, and time and well-developed, well-nourished, and in no distress. She appears healthy.  Non-toxic appearance. No distress. HENT:   Head: Normocephalic and atraumatic. Right Ear: External ear normal.   Left Ear: External ear normal.   Nose: Nose normal.   Eyes: Lids are normal. Right eye exhibits no discharge. Left eye exhibits no discharge. Neck: Neck supple.    Pulmonary/Chest: Effort normal.   Musculoskeletal:        Cervical back: She exhibits bony tenderness and pain.        Lumbar back: She exhibits tenderness, bony tenderness and pain. Neurological: She is alert and oriented to person, place, and time. Gait normal. Coordination and gait normal.   Skin: Skin is warm and dry. She is not diaphoretic. No pallor. Psychiatric: Mood and affect normal.   Nursing note and vitals reviewed.       ASSESSMENT:    1. DDD (degenerative disc disease), lumbar    2. DDD (degenerative disc disease), cervical    3. Primary osteoarthritis involving multiple joints    4. Chronic pain syndrome    5. Lumbosacral spondylosis without myelopathy    6. Muscle spasms of both lower extremities    7. Neck pain    8. Scoliosis, unspecified scoliosis type, unspecified spinal region    9. Cervical spondylosis without myelopathy    10. Encounter for long-term (current) use of high-risk medication    11. Cervical stenosis of spinal canal          [place] Prescription Monitoring Program was reviewed which does not demonstrate aberrancies and/or inconsistencies with regard to the historical prescribing of controlled medications to this patient by other providers. Medications were brought to visit today. Pill count was appropriate. When possible, non-drug therapy for chronic pain should be used as a first-line treatment. Physical therapy exercise regimens, chiropractic manipulation, meditation relaxation techniques, cognitive behavior therapy, acupuncture, yoga, Shaji Chi,  transcutaneous electrical nerve stimulation (TENS), and application of moist heat can help alleviate pain . Explained that realistic expectations and goals with chronic pain management are to maximize function and minimize pain with the understanding that limitations will exist both in the extent of relief that she may achieve, as well as thresholds of mg strengths that we will not exceed. Our role is to help the patient better cope with chronic pain utilizng a multimodal approach.       The patients condition and plan were discussed. All questions were answered. The patient agrees with the plan. PLAN / Pt Instructions:     1. Continue current plan with no evidence of addiction or diversion. 2. Stable on current medication without adverse events.    3. Refill Oxycontin IR  20 mg take one tablet 2 times daily  4. Refill Oxycodone IR 10 mg take one tablet up to 3 times daily as needed  5. Discussed risks of addiction, dependency, and opioid induced hyperalgesia. 6. Return to clinic in 3 months    Prescription monitoring program reviewed. Pain medications prescribed with the objective of pain relief and improved physical and psychosocial function in this patient. DISPOSITION  · Counseled patient on proper use of prescribed medications. · Counseled patient about chronic medical conditions and their relationship to anxiety and depression and recommended mental health support as needed. · Reviewed with patient self-help tools, home exercise, and lifestyle changes to assist the patient in self-management of symptoms. · Reviewed with patient the treatment plan, goals of treatment plan, and limitations of treatment plan, to include the potential for side effects from medications and procedures. If side effects occur, it is the responsibility of the patient to inform the clinic so that a change in the treatment plan can be made in a safe manner. The patient is advised that stopping prescribed medication may cause an increase in symptoms and possible medication withdrawal symptoms. The patient is informed an emergency room evaluation may be necessary if this occurs. Spent 25 minutes with patient today reviewing the treatment plan, goals of treatment plan, and limitations of the treatment plan, to include the potential for side effects from medications and procedures. More than 50% of the visit time was spent counseling the patient.        Eleuterio Calderon PA-C 3/15/2018        Note: Please excuse any typographical errors. Voice recognition software was used for this note and may cause mistakes.

## 2018-03-15 NOTE — PROGRESS NOTES
Nursing Notes    Patient presents to the office today in follow-up. Patient rates her pain at 5/10 on the numerical pain scale. Reviewed medications with counts as follows:    Rx Date filled Qty Dispensed Pill Count Last Dose Short   Oxycontin 20 mg ER Tab 2/20/18 60 17 today no   Oxyocodone HCL 10 mg Tab 2/20/18 90 25 today no                                  Comments:     POC UDS was not performed in office today    Any new labs or imaging since last appointment? NO    Have you been to an emergency room (ER) or urgent care clinic since your last visit? NO            Have you been hospitalized since your last visit? NO     If yes, where, when, and reason for visit? Have you seen or consulted any other health care providers outside of the [address] [place] [address]  since your last visit? NO     If yes, where, when, and reason for visit? HM deferred to pcp.

## 2018-06-18 ENCOUNTER — TELEPHONE (OUTPATIENT)
Dept: PAIN MANAGEMENT | Age: 63
End: 2018-06-18

## 2018-06-18 NOTE — TELEPHONE ENCOUNTER
Returned patient's call. Patient's ride did not show up 06/15/18, so patient missed appointment. Patient will stretch medications says she has enough until 06/22/18. Appointment given at 11:00 on 06/22/18 with [facility] patient to arrive at 10:30. Patient states an understanding of instructions.

## 2018-06-22 ENCOUNTER — OFFICE VISIT (OUTPATIENT)
Dept: PAIN MANAGEMENT | Age: 63
End: 2018-06-22

## 2018-06-22 VITALS
TEMPERATURE: 97.2 F | RESPIRATION RATE: 14 BRPM | BODY MASS INDEX: 17.49 KG/M2 | HEART RATE: 79 BPM | HEIGHT: 65 IN | DIASTOLIC BLOOD PRESSURE: 92 MMHG | WEIGHT: 105 LBS | SYSTOLIC BLOOD PRESSURE: 144 MMHG

## 2018-06-22 DIAGNOSIS — Z79.899 ENCOUNTER FOR LONG-TERM (CURRENT) USE OF HIGH-RISK MEDICATION: ICD-10-CM

## 2018-06-22 DIAGNOSIS — M50.30 DDD (DEGENERATIVE DISC DISEASE), CERVICAL: ICD-10-CM

## 2018-06-22 DIAGNOSIS — G89.29 CHRONIC MIDLINE LOW BACK PAIN WITH RIGHT-SIDED SCIATICA: ICD-10-CM

## 2018-06-22 DIAGNOSIS — M48.02 CERVICAL STENOSIS OF SPINAL CANAL: ICD-10-CM

## 2018-06-22 DIAGNOSIS — M54.41 CHRONIC MIDLINE LOW BACK PAIN WITH RIGHT-SIDED SCIATICA: ICD-10-CM

## 2018-06-22 DIAGNOSIS — M51.36 DDD (DEGENERATIVE DISC DISEASE), LUMBAR: Primary | ICD-10-CM

## 2018-06-22 DIAGNOSIS — G89.4 CHRONIC PAIN SYNDROME: ICD-10-CM

## 2018-06-22 DIAGNOSIS — M47.817 LUMBOSACRAL SPONDYLOSIS WITHOUT MYELOPATHY: ICD-10-CM

## 2018-06-22 LAB
ALCOHOL UR POC: NORMAL
AMPHETAMINES UR POC: NEGATIVE
BARBITURATES UR POC: NORMAL
BENZODIAZEPINES UR POC: NEGATIVE
BUPRENORPHINE UR POC: NEGATIVE
CANNABINOIDS UR POC: NEGATIVE
CARISOPRODOL UR POC: NORMAL
COCAINE UR POC: NEGATIVE
FENTANYL UR POC: NORMAL
MDMA/ECSTASY UR POC: NORMAL
METHADONE UR POC: NEGATIVE
METHAMPHETAMINE UR POC: NORMAL
METHYLPHENIDATE UR POC: NORMAL
OPIATES UR POC: NEGATIVE
OXYCODONE UR POC: NORMAL
PHENCYCLIDINE UR POC: NORMAL
PROPOXYPHENE UR POC: NORMAL
TRAMADOL UR POC: NORMAL
TRICYCLICS UR POC: NORMAL

## 2018-06-22 RX ORDER — IBUPROFEN 200 MG
1 TABLET ORAL EVERY 24 HOURS
COMMUNITY
End: 2021-09-20

## 2018-06-22 RX ORDER — OXYCODONE HYDROCHLORIDE 15 MG/1
15 TABLET, FILM COATED, EXTENDED RELEASE ORAL EVERY 12 HOURS
Qty: 60 TAB | Refills: 0 | Status: SHIPPED | OUTPATIENT
Start: 2018-06-22 | End: 2018-07-22

## 2018-06-22 RX ORDER — CAPSAICIN 0.07 G/100G
CREAM TOPICAL
Qty: 60 G | Refills: 0 | Status: SHIPPED | OUTPATIENT
Start: 2018-06-22 | End: 2021-09-20

## 2018-06-22 RX ORDER — OXYCODONE HYDROCHLORIDE 10 MG/1
10 TABLET ORAL
Qty: 90 TAB | Refills: 0 | Status: SHIPPED | OUTPATIENT
Start: 2018-06-22 | End: 2018-07-22

## 2018-06-22 NOTE — PROGRESS NOTES
Referral date 6/25/2013, source PCP Dr. Saurabh Man and for neck and back pain. HPI:  Liana Arellano is a 61 y.o. female here for f/u visit for ongoing evaluation of neck and back pain. Pt was last seen here on 3/15/218. Pt denies interval changes on the character or distribution of pain. Pain is located to cervical, thoracic and lumbar midline areas and radiating down right leg stopping at right knee. The pain is described as burning in her lower back and aching and stabbing in her neck. Pain at its best is 3/10. Pain at its worse is 10/10. The pain is worsened by certain positions and bending. Symptoms are relieved by being still, OxyContin and oxycodone mediations and topical Biofreeze. Pt has tried multiple cervical, thoracic and lumbar corticosteroid injections with no perceived benefit. She has not have PT for her neck or back in over 30 years. She has never tried ice, heat, tylenol, NSAIDs or TENS unit use. Social History     Social History    Marital status: UNKNOWN     Spouse name: N/A    Number of children: N/A    Years of education: N/A     Occupational History    Not on file. Social History Main Topics    Smoking status: Former Smoker     Packs/day: 0.25    Smokeless tobacco: Never Used      Comment: Pt quite 4 days ago and wears nicotine patch.     Alcohol use No    Drug use: No    Sexual activity: Not on file      Comment: post  menopausal     Other Topics Concern    Not on file     Social History Narrative     Family History   Problem Relation Age of Onset    Diabetes Father     Heart Attack Father     Heart Disease Father     Headache Sister     Hypertension Brother      Allergies   Allergen Reactions    Prozac [Fluoxetine] Rash and Itching     Past Medical History:   Diagnosis Date    Abnormal weight loss     Back pain 1970    Chest pain     COPD (chronic obstructive pulmonary disease) ([facility])     Fatigue     Insomnia     Joint pain     Muscle pain     Neck injury 1970    Neurological disorder     pain,involuntary movements,poor concentration.  OA (osteoarthritis)     Snoring     Stiffness in joint      Past Surgical History:   Procedure Laterality Date    HX TONSIL AND ADENOIDECTOMY  1973     Current Outpatient Prescriptions on File Prior to Visit   Medication Sig    multivitamin (ONE A DAY) tablet Take 1 Tab by mouth daily.  aspirin 81 mg tablet Take 81 mg by mouth.  naloxone 4 mg/actuation spry 4 mg by Nasal route as needed. No current facility-administered medications on file prior to visit. Review of Systems   Constitutional: Negative for chills, fever and malaise/fatigue. Respiratory: Positive for wheezing. Negative for shortness of breath. Cardiovascular: Negative for chest pain. Gastrointestinal: Negative for abdominal pain, blood in stool, constipation, diarrhea, nausea and vomiting. Musculoskeletal: Positive for back pain and neck pain. Psychiatric/Behavioral: Positive for depression. Negative for substance abuse and suicidal ideas. The patient is nervous/anxious and has insomnia. Denies homicidal ideas. Family history of alcohol abuse. Opioid specific risk: insomnia, generalized anxiety disorder, PTSD, bipolar disorder COPD per chart review (pt declines this diagnosis) and smoking. Opioid specific history: continuous opioid use since 2013. Vitals:    06/22/18 1125   BP: (!) 144/92   Pulse: 79   Resp: 14   Temp: 97.2 °F (36.2 °C)   TempSrc: Oral   Weight: 47.6 kg (105 lb)   Height: 5' 5\" (1.651 m)   PainSc:   3   PainLoc: Back        Imaging:  MRI CERVICAL SPINE WO CONT 7/8/2013  Findings:  Images are mildly compromised throughout by motion artifact and decreased signal to noise. Artifact is sufficient to make CSF isointense to epidural fat on T2-weighted axial images. Within this limitation: Sagittal images reveal normal vertebral body morphology. No fractures  Noted.  No suspicious lesions No abnormal marrow signal present. Alignments are anatomic, no evidence for subluxation.     Visualized prevertebral soft tissues are unremarkable. Atlanto-dens interval normal.  No evidence for Chiari 1 malformation. Cord morphology and signal intensity are unremarkable throughout.     Correlation of axial and sagittal images reveals the following: At C2-C3: Small central disc protrusion. No central canal or foraminal  stenosis. No facet arthropathy. At C3-C4: Mild broad-based disc osteophyte complex is most prominent left posterior laterally. No facet arthropathy. Mild central canal stenosis at 9.5 mm. No foraminal stenosis. At C4-C5: Moderate loss of disc height. Moderate circumferential disc  osteophyte complex especially at the corners. Mild facet arthropathy. Moderate to severe bilateral foraminal stenosis. Moderate central canal stenosis at 7.6 mm. At C5-C6: Again mild and moderate circumferential disc osteophyte complex, most prominent at the left posterior lateral corner. No facet arthropathy. Mild right and moderate left foraminal stenosis. Mild central canal stenosis. At C6-C7: Moderate loss of disc height. Mild to moderate circumferential disc osteophyte complex. Moderate central canal stenosis at 7.6 mm. No facet arthropathy. No foraminal stenosis. At C7-T1: No significant disc pathology or proliferative changes. No central canal or foraminal stenosis. MRI LUMB SPINE WO CONT 7/8/2013  Findings:   Sagittal images reveal overall normal vertebral body morphology. No fractures noted. No suspicious lesions. Alignments are anatomic, no evidence for subluxation. Conus medullaris ends at the L1 vertebral body level. Correlation of axial and sagittal images reveals the following:  At L1-L2: Mild loss of disc height. Mild circumferential disc bulge. No central canal or foraminal stenosis. No facet arthropathy. At L2-L3: Mild circumferential disc bulge. No central canal stenosis. Minimal facet arthropathy.  No foraminal stenosis. Moderate loss of disc height anteriorly and mild posteriorly. At L3-L4: Again circumferential disc bulge with some underlying osteophyte complex at the posterior lateral corners. While facet arthropathy. No right foraminal stenosis. Mild to moderate left foraminal stenosis. At L4-L5: Mild to moderate circumferential disc bulge with underlying  osteophyte complex, most prominent at the posterior lateral corners. Mild facet arthropathy. Some posterior epidural lipomatosis. Mild central canal stenosis at 10 mm. Moderate left and moderate right stenosis of the proximal foramina. At L5-S1: Mild circumferential disc bulge. Mild facet arthropathy. No central canal stenosis. Mild to moderate left and moderate right foraminal stenosis.     Visualized portions of the sacroiliac joints are unremarkable. Incidentally  imaged retroperitoneal structures are unremarkable as well.     Labs: none available in EMR    PE:  Physical Exam    AFVSS despite mildly elevated blood pressure, no acute distress, normal body habitus. A&OXs 3. Normocephalic, atraumatic. Conjugate gaze, clear sclerae. Gait is within functional limits. Balance is within functional limits. UE:   Strength for right upper extremity is 5/5 for shoulder abduction, elbow flexion, wrist extension, elbow extension, finger abduction, flexor digitorum profundus to digits 2 through 5. Strength for left upper extremity is 5/5 for shoulder abduction, elbow flexion, wrist extension, elbow extension, finger abduction, flexor digitorum profundus to digits 2 through 5. Sensation to light touch is intact for left C4-T1. Sensation to light touch is intact for right C4-T1. Muscle stretch reflexes for right upper extremity are 2+ for C5, C6, C7. Muscle stretch reflexes for left upper extremity are 2+ for C5, C6, C7.     LE:   Strength for right lower extremity is 5/5 for hip flexion, knee extension, dorsiflexion, extensor hallucis longus, plantar flexion. Strength for left lower extremity is 5/5 for hip flexion, knee extension, dorsiflexion, extensor hallucis longus, plantar flexion. Sensation to light touch is intact for right L2-S2. Sensation to light touch is intact for left L2-S2. Muscle Stretch reflexes for right lower extremity are 2+ for L4,  S1.   Muscle Stretch reflexes for left  lower extremity are 2+ for L4, S1. Positive seated and supine straight leg raise on the right. Positive Stinchfield's on the right. Positive FABERs on the right. Negative FADIRS on the right and the left. Tenderness with palpation noted to cervical, thoracic and lumbar vertebra. Active ROM lumbar flexion and extension decreased by 50% with poor effort noted and reproduction of primary pain in lower back noted. Active full ROM cervical flexion and extension with reproduction of primary neck pain noted with flexion only. Active full ROM cervical rotation to the left but decreased by 25% on the right. Active trigger point noted to left trapezius muscle. Calculated MEQ - 105  Naloxone rescue - yes  Prophylactic bowel program - no, order next visit   Date of last OCA 1/2/2018  Last UDS today, consistent   date checked today, findings consistent    Primary Care Physician  Not On File [facility]  Not On File (62) Patient has a PCP but that physician is not listed in [facility]. None    Today  ALMA - 42%  PGIC - 6 and 2  COMM - 12    PHQ -- . PHQ over the last two weeks 6/22/2018   Little interest or pleasure in doing things Not at all   Feeling down, depressed or hopeless Not at all   Total Score PHQ 2 0       DSM V-OUD Screen - defer to next visit    Assessment/Plan:     ICD-10-CM ICD-9-CM    1. DDD (degenerative disc disease), lumbar M51.36 722.52 oxyCODONE ER (OXYCONTIN) 15 mg ER tablet      oxyCODONE IR (ROXICODONE) 10 mg tab immediate release tablet      TENS Units (TENS 504) celia      capsaicin 0.075 % topical cream   2.  Encounter for long-term (current) use of high-risk medication Z79.899 V58.69 DRUG SCREEN      AMB POC DRUG SCREEN ()   3. DDD (degenerative disc disease), cervical M50.30 722.4 oxyCODONE ER (OXYCONTIN) 15 mg ER tablet      oxyCODONE IR (ROXICODONE) 10 mg tab immediate release tablet      TENS Units (TENS 504) celia      capsaicin 0.075 % topical cream   4. Chronic pain syndrome G89.4 338.4 oxyCODONE ER (OXYCONTIN) 15 mg ER tablet      oxyCODONE IR (ROXICODONE) 10 mg tab immediate release tablet      TENS Units (TENS 504) celia      capsaicin 0.075 % topical cream   5. Chronic midline low back pain with right-sided sciatica M54.41 724.2     G89.29 724.3      338.29    6. Lumbosacral spondylosis without myelopathy M47.817 721.3    7. Cervical stenosis of spinal canal M48.02 723.0         Pt currently taking OxyContin 20mg BID and oxycodone IR 10mg TID prn pain with a total of 90 tablets with total MME of 105. Today we will start the weaning of her opioid medication with a goal of being opioid free. Pt will continue oxycodone IR 10mg TID prn pain as previously prescribed but will start OxyContin 15mg BID with total MME of 90. Next visit, the plan is to decreased OxyContin to 10mg BID and keep oxycodone IR the same (10mg TID prn) with total MME of 75 being patient safety and compliance. Pt currently on nicotine patch to help with smoking cessation. Importance of smoking cessation discussed with patient as a way to help improve her chronic pain. Ordered TENS unit today and topical Capsaicin cream. Next visit order bowel regimen with senna and colace. In the future, consider aquatic PT referral and acupuncture therapy. PT state she is not interested in injections, SCS or implantable pain pumps. .    Follow up ongoing assessment and ongoing development of integrative and comprehensive plan of care for chronic pain.     GOALS:  To establish complementary and integrative plan of care to address chronic pain issues while minimizing pharmaceuticals to maximize patient's function improve quality of life. Education:  Patient again educated on the importance of strict compliance with the opioid care agreement while on opioid therapy. Patient also again educated that they should avoid driving while on chronic opioid therapy. Also advised to avoid alcohol and to avoid benzodiazepines while on opioid therapy. Patient Homework: Independently investigate yoga for persons with chronic pain. F/u:. Follow-up Disposition:  Return in about 30 days (around 7/22/2018) for 30 min.

## 2018-06-22 NOTE — PATIENT INSTRUCTIONS
Stopping Smoking: Care Instructions  Your Care Instructions    Cigarette smokers crave the nicotine in cigarettes. Giving it up is much harder than simply changing a habit. Your body has to stop craving the nicotine. It is hard to quit, but you can do it. There are many tools that people use to quit smoking. You may find that combining tools works best for you. There are several steps to quitting. First you get ready to quit. Then you get support to help you. After that, you learn new skills and behaviors to become a nonsmoker. For many people, a necessary step is getting and using medicine. Your doctor will help you set up the plan that best meets your needs. You may want to attend a smoking cessation program to help you quit smoking. When you choose a program, look for one that has proven success. Ask your doctor for ideas. You will greatly increase your chances of success if you take medicine as well as get counseling or join a cessation program.  Some of the changes you feel when you first quit tobacco are uncomfortable. Your body will miss the nicotine at first, and you may feel short-tempered and grumpy. You may have trouble sleeping or concentrating. Medicine can help you deal with these symptoms. You may struggle with changing your smoking habits and rituals. The last step is the tricky one: Be prepared for the smoking urge to continue for a time. This is a lot to deal with, but keep at it. You will feel better. Follow-up care is a key part of your treatment and safety. Be sure to make and go to all appointments, and call your doctor if you are having problems. It's also a good idea to know your test results and keep a list of the medicines you take. How can you care for yourself at home? · Ask your family, friends, and coworkers for support. You have a better chance of quitting if you have help and support.   · Join a support group, such as Nicotine Anonymous, for people who are trying to quit smoking. · Consider signing up for a smoking cessation program, such as the American Lung Association's Freedom from Smoking program.  · Set a quit date. Pick your date carefully so that it is not right in the middle of a big deadline or stressful time. Once you quit, do not even take a puff. Get rid of all ashtrays and lighters after your last cigarette. Clean your house and your clothes so that they do not smell of smoke. · Learn how to be a nonsmoker. Think about ways you can avoid those things that make you reach for a cigarette. ¨ Avoid situations that put you at greatest risk for smoking. For some people, it is hard to have a drink with friends without smoking. For others, they might skip a coffee break with coworkers who smoke. ¨ Change your daily routine. Take a different route to work or eat a meal in a different place. · Cut down on stress. Calm yourself or release tension by doing an activity you enjoy, such as reading a book, taking a hot bath, or gardening. · Talk to your doctor or pharmacist about nicotine replacement therapy, which replaces the nicotine in your body. You still get nicotine but you do not use tobacco. Nicotine replacement products help you slowly reduce the amount of nicotine you need. These products come in several forms, many of them available over-the-counter:  ¨ Nicotine patches  ¨ Nicotine gum and lozenges  ¨ Nicotine inhaler  · Ask your doctor about bupropion (Wellbutrin) or varenicline (Chantix), which are prescription medicines. They do not contain nicotine. They help you by reducing withdrawal symptoms, such as stress and anxiety. · Some people find hypnosis, acupuncture, and massage helpful for ending the smoking habit. · Eat a healthy diet and get regular exercise. Having healthy habits will help your body move past its craving for nicotine. · Be prepared to keep trying. Most people are not successful the first few times they try to quit.  Do not get mad at yourself if you smoke again. Make a list of things you learned and think about when you want to try again, such as next week, next month, or next year. Where can you learn more? Go to http://dave-abhishek.info/. Enter Q696 in the search box to learn more about \"Stopping Smoking: Care Instructions. \"  Current as of: March 20, 2017  Content Version: 11.4  © 6834-7331 [employer]. Care instructions adapted under license by [employer] (which disclaims liability or warranty for this information). If you have questions about a medical condition or this instruction, always ask your healthcare professional. [address] any warranty or liability for your use of this information. Safe Use of Opioid Pain Medicine: Care Instructions  Your Care Instructions  Pain is your body's way of warning you that something is wrong. Pain feels different for everybody. Only you can describe your pain. A doctor can suggest or prescribe many types of medicines for pain. These range from over-the-counter medicines like acetaminophen (Tylenol) to powerful medicines called opioids. Examples of opioids are fentanyl, hydrocodone, morphine, and oxycodone. Heroin is an illegal opioid  Opioids are strong medicines. They can help you manage pain when you use them the right way. But if you misuse them, they can cause serious harm and even death. For these reasons, doctors are very careful about how they prescribe opioids. If you decide to take opioids, here are some things to remember. · Keep your doctor informed. You can get addicted to opioids. The risk is higher if you have a history of substance use. Your doctor will monitor you closely for signs of misuse and addiction and to figure out when you no longer need to take opioids. · Make a treatment plan. The goal of your plan is to be able to function and do the things you need to do, even if you still have some pain.  You might be able to manage your pain with other non-opioid options like physical therapy, relaxation, or over-the-counter pain medicines. · Be aware of the side effects. Opioids can cause serious side effects, such as constipation, dry mouth, and nausea. And over time, you may need a higher dose to get pain relief. This is called tolerance. Your body also gets used to opioids. This is called physical dependence. If you suddenly stop taking them, you may have withdrawal symptoms. The doctor carefully considered what pain medicine is right for you. You may not have received opioids if your doctor was concerned about drug interactions or your safety, or if he or she had other concerns. It is best to have one doctor or clinic treat your pain. This way you will get the pain medicine that will help you the most. And a doctor will be able to watch for any problems that the medicine might cause. The doctor has checked you carefully, but problems can develop later. If you notice any problems or new symptoms,  get medical treatment right away. Follow-up care is a key part of your treatment and safety. Be sure to make and go to all appointments, and call your doctor if you are having problems. It's also a good idea to know your test results and keep a list of the medicines you take. How can you care for yourself at home? · If you need to take opioids to manage your pain, remember these safety tips. ¨ Follow directions carefully. It's easy to misuse opioids if you take a dose other than what's prescribed by your doctor. This can lead to overdose and even death. Even sharing them with someone they weren't meant for is misuse. ¨ Be cautious. Opioids may affect your judgment and decision making. Do not drive or operate machinery until you can think clearly. Talk with your doctor about when it is safe to drive. ¨ Reduce the risk of drug interactions.  Opioids can be dangerous if you take them with alcohol or with certain drugs like sleeping pills and muscle relaxers. Make sure your doctor knows about all the other medicines you take, including over-the-counter medicines. Don't start any new medicines before you talk to your doctor or pharmacist.  Anette Linares Keep others safe. Store opioids in a safe and secure place. Make sure that pets, children, friends, and family can't get to them. When you're done using opioids, make sure to properly dispose of them. You can either use a community drug take-back program or your drugstore's mail-back program. If one of these programs isn't available, you can flush opioid skin patches and unused opioid pills down the toilet. ¨ Reduce the risk of overdose. Misuse of opioids can be very dangerous. Protect yourself by asking your doctor about a naloxone rescue kit. It can help you-and even save your life-if you take too much of an opioid. · Try other ways to reduce pain. ¨ Relax, and reduce stress. Relaxation techniques such as deep breathing or meditation can help. ¨ Keep moving. Gentle, daily exercise can help reduce pain over the long run. Try low- or no-impact exercises such as walking, swimming, and stationary biking. Do stretches to stay flexible. ¨ Try heat, cold packs, and massage. ¨ Get enough sleep. Pain can make you tired and drain your energy. Talk with your doctor if you have trouble sleeping because of pain. ¨ Think positive. Your thoughts can affect your pain level. Do things that you enjoy to distract yourself when you have pain instead of focusing on the pain. See a movie, read a book, listen to music, or spend time with a friend. · If you are not taking a prescription pain medicine, ask your doctor if you can take an over-the-counter medicine. When should you call for help? Call your doctor now or seek immediate medical care if:  ? · You have a new kind of pain. ? · You have new symptoms, such as a fever or rash, along with the pain. ? Watch closely for changes in your health, and be sure to contact your doctor if:  ? · You think you might be using too much pain medicine, and you need help to use less or stop. ? · Your pain gets worse. ? · You would like a referral to a doctor or clinic that specializes in pain management. Where can you learn more? Go to http://dave-abhishek.info/. Enter R108 in the search box to learn more about \"Safe Use of Opioid Pain Medicine: Care Instructions. \"  Current as of: October 14, 2016  Content Version: 11.4  © 1269-6366 [employer]. Care instructions adapted under license by [employer] (which disclaims liability or warranty for this information). If you have questions about a medical condition or this instruction, always ask your healthcare professional. [address] any warranty or liability for your use of this information.

## 2018-06-22 NOTE — MR AVS SNAPSHOT
[address] [facility] [address] 
[phone] Patient: Frannie Guillermo MRN: [id number] Visit Information Date & Time Provider Department Dept. Phone Encounter #  
 6/22/2018 11:00 AM Corey Calvert, [address] [place] [address] for Pain Management [id number] Follow-up Instructions Return in about 30 days (around 7/22/2018) for 30 min. Your Appointments 7/20/2018  2:30 PM  
Follow Up with Ilia Duarte NP [address] [place] [address] Pain Management (BERHANE SCHEDULING) Appt Note: Return in about 30 days (around 7/22/2018) for 30 min 30 [facility] [address]  
[phone] [facility] 1588 [id number] Upcoming Health Maintenance Date Due Hepatitis C Screening 1955 Pneumococcal 19-64 Medium Risk (1 of 1 - PPSV23) 1/22/1974 DTaP/Tdap/Td series (1 - Tdap) 1/22/1976 PAP AKA CERVICAL CYTOLOGY 1/22/1976 BREAST CANCER SCRN MAMMOGRAM 1/22/2005 FOBT Q 1 YEAR AGE 50-75 1/22/2005 ZOSTER VACCINE AGE 60> 11/22/2014 MEDICARE YEARLY EXAM 3/14/2018 Influenza Age 5 to Adult 8/1/2018 Allergies as of 6/22/2018  Review Complete On: 6/22/2018 By: Marylou Gunn RN Severity Noted Reaction Type Reactions Prozac [Fluoxetine]  07/31/2013    Rash, Itching Current Immunizations  Never Reviewed No immunizations on file. Not reviewed this visit You Were Diagnosed With   
  
 Codes Comments DDD (degenerative disc disease), lumbar    -  Primary ICD-10-CM: M51.36 
ICD-9-CM: 722.52 Encounter for long-term (current) use of high-risk medication     ICD-10-CM: Z79.899 ICD-9-CM: V58.69   
 DDD (degenerative disc disease), cervical     ICD-10-CM: M50.30 ICD-9-CM: 722.4 Chronic pain syndrome     ICD-10-CM: G89.4 ICD-9-CM: 338. 4 Vitals BP Pulse Temp Resp Height(growth percentile) Weight(growth percentile) (!) 144/92 (BP 1 Location: Right arm, BP Patient Position: Sitting) 79 97.2 °F (36.2 °C) (Oral) 14 5' 5\" (1.651 m) 105 lb (47.6 kg) BMI OB Status Smoking Status 17.47 kg/m2 Postmenopausal Former Smoker Vitals History BMI and BSA Data Body Mass Index Body Surface Area  
 17.47 kg/m 2 1.48 m 2 Preferred Pharmacy Pharmacy Name Phone [address] Justine Cheney [address] [address] [facility] [phone] Your Updated Medication List  
  
   
This list is accurate as of 6/22/18 12:29 PM.  Always use your most recent med list.  
  
  
  
  
 aspirin 81 mg tablet Take 81 mg by mouth.  
  
 capsaicin 0.075 % topical cream  
Apply cream to lower back TID. Avoid use on damaged, broken or irritated skin. Avoid occlusive dressings or heat while using this medication. multivitamin tablet Commonly known as:  ONE A DAY Take 1 Tab by mouth daily. naloxone 4 mg/actuation nasal spray Commonly known as:  NARCAN  
4 mg by Nasal route as needed. nicotine 14 mg/24 hr patch Commonly known as:  NICODERM CQ  
1 Patch by TransDERmal route every twenty-four (24) hours. * oxyCODONE ER 15 mg ER tablet Commonly known as:  OxyCONTIN Take 1 Tab by mouth every twelve (12) hours for 30 days. Max Daily Amount: 30 mg.  
  
 * oxyCODONE IR 10 mg Tab immediate release tablet Commonly known as:  Carrillo Seats Take 1 Tab by mouth every eight (8) hours as needed for Pain for up to 30 days. Max Daily Amount: 30 mg.  
  
 TENS Units Remi Whaley Commonly known as:  TENS 504 Please provide patient TENS unit device and pads for lower back pain. * Notice: This list has 2 medication(s) that are the same as other medications prescribed for you. Read the directions carefully, and ask your doctor or other care provider to review them with you. Prescriptions Printed Refills oxyCODONE ER (OXYCONTIN) 15 mg ER tablet 0 Sig: Take 1 Tab by mouth every twelve (12) hours for 30 days. Max Daily Amount: 30 mg.  
 Class: Print Route: Oral  
 oxyCODONE IR (ROXICODONE) 10 mg tab immediate release tablet 0 Sig: Take 1 Tab by mouth every eight (8) hours as needed for Pain for up to 30 days. Max Daily Amount: 30 mg.  
 Class: Print Route: Oral  
 TENS Units (TENS 504) celia 0 Sig: Please provide patient TENS unit device and pads for lower back pain. Class: Print  
 capsaicin 0.075 % topical cream 0 Sig: Apply cream to lower back TID. Avoid use on damaged, broken or irritated skin. Avoid occlusive dressings or heat while using this medication. Class: Print We Performed the Following AMB POC DRUG SCREEN () [ [facility]] DRUG SCREEN [[id number] Custom] Follow-up Instructions Return in about 30 days (around 7/22/2018) for 30 min. Patient Instructions Stopping Smoking: Care Instructions Your Care Instructions Cigarette smokers crave the nicotine in cigarettes. Giving it up is much harder than simply changing a habit. Your body has to stop craving the nicotine. It is hard to quit, but you can do it. There are many tools that people use to quit smoking. You may find that combining tools works best for you. There are several steps to quitting. First you get ready to quit. Then you get support to help you. After that, you learn new skills and behaviors to become a nonsmoker. For many people, a necessary step is getting and using medicine. Your doctor will help you set up the plan that best meets your needs. You may want to attend a smoking cessation program to help you quit smoking. When you choose a program, look for one that has proven success. Ask your doctor for ideas.  You will greatly increase your chances of success if you take medicine as well as get counseling or join a cessation program. 
 Some of the changes you feel when you first quit tobacco are uncomfortable. Your body will miss the nicotine at first, and you may feel short-tempered and grumpy. You may have trouble sleeping or concentrating. Medicine can help you deal with these symptoms. You may struggle with changing your smoking habits and rituals. The last step is the tricky one: Be prepared for the smoking urge to continue for a time. This is a lot to deal with, but keep at it. You will feel better. Follow-up care is a key part of your treatment and safety. Be sure to make and go to all appointments, and call your doctor if you are having problems. It's also a good idea to know your test results and keep a list of the medicines you take. How can you care for yourself at home? · Ask your family, friends, and coworkers for support. You have a better chance of quitting if you have help and support. · Join a support group, such as Nicotine Anonymous, for people who are trying to quit smoking. · Consider signing up for a smoking cessation program, such as the American Lung Association's Freedom from Smoking program. 
· Set a quit date. Pick your date carefully so that it is not right in the middle of a big deadline or stressful time. Once you quit, do not even take a puff. Get rid of all ashtrays and lighters after your last cigarette. Clean your house and your clothes so that they do not smell of smoke. · Learn how to be a nonsmoker. Think about ways you can avoid those things that make you reach for a cigarette. ¨ Avoid situations that put you at greatest risk for smoking. For some people, it is hard to have a drink with friends without smoking. For others, they might skip a coffee break with coworkers who smoke. ¨ Change your daily routine. Take a different route to work or eat a meal in a different place. · Cut down on stress.  Calm yourself or release tension by doing an activity you enjoy, such as reading a book, taking a hot bath, or gardening. · Talk to your doctor or pharmacist about nicotine replacement therapy, which replaces the nicotine in your body. You still get nicotine but you do not use tobacco. Nicotine replacement products help you slowly reduce the amount of nicotine you need. These products come in several forms, many of them available over-the-counter: ¨ Nicotine patches ¨ Nicotine gum and lozenges ¨ Nicotine inhaler · Ask your doctor about bupropion (Wellbutrin) or varenicline (Chantix), which are prescription medicines. They do not contain nicotine. They help you by reducing withdrawal symptoms, such as stress and anxiety. · Some people find hypnosis, acupuncture, and massage helpful for ending the smoking habit. · Eat a healthy diet and get regular exercise. Having healthy habits will help your body move past its craving for nicotine. · Be prepared to keep trying. Most people are not successful the first few times they try to quit. Do not get mad at yourself if you smoke again. Make a list of things you learned and think about when you want to try again, such as next week, next month, or next year. Where can you learn more? Go to http://daveGreenhouse Softwareabhishek.info/. Enter K907 in the search box to learn more about \"Stopping Smoking: Care Instructions. \" Current as of: March 20, 2017 Content Version: 11.4 © 0466-1554 [employer]. Care instructions adapted under license by [employer] (which disclaims liability or warranty for this information). If you have questions about a medical condition or this instruction, always ask your healthcare professional. [place] [address] any warranty or liability for your use of this information. Safe Use of Opioid Pain Medicine: Care Instructions Your Care Instructions Pain is your body's way of warning you that something is wrong.  Pain feels different for everybody. Only you can describe your pain. A doctor can suggest or prescribe many types of medicines for pain. These range from over-the-counter medicines like acetaminophen (Tylenol) to powerful medicines called opioids. Examples of opioids are fentanyl, hydrocodone, morphine, and oxycodone. Heroin is an illegal opioid Opioids are strong medicines. They can help you manage pain when you use them the right way. But if you misuse them, they can cause serious harm and even death. For these reasons, doctors are very careful about how they prescribe opioids. If you decide to take opioids, here are some things to remember. · Keep your doctor informed. You can get addicted to opioids. The risk is higher if you have a history of substance use. Your doctor will monitor you closely for signs of misuse and addiction and to figure out when you no longer need to take opioids. · Make a treatment plan. The goal of your plan is to be able to function and do the things you need to do, even if you still have some pain. You might be able to manage your pain with other non-opioid options like physical therapy, relaxation, or over-the-counter pain medicines. · Be aware of the side effects. Opioids can cause serious side effects, such as constipation, dry mouth, and nausea. And over time, you may need a higher dose to get pain relief. This is called tolerance. Your body also gets used to opioids. This is called physical dependence. If you suddenly stop taking them, you may have withdrawal symptoms. The doctor carefully considered what pain medicine is right for you. You may not have received opioids if your doctor was concerned about drug interactions or your safety, or if he or she had other concerns. It is best to have one doctor or clinic treat your pain. This way you will get the pain medicine that will help you the most. And a doctor will be able to watch for any problems that the medicine might cause. The doctor has checked you carefully, but problems can develop later. If you notice any problems or new symptoms,  get medical treatment right away. Follow-up care is a key part of your treatment and safety. Be sure to make and go to all appointments, and call your doctor if you are having problems. It's also a good idea to know your test results and keep a list of the medicines you take. How can you care for yourself at home? · If you need to take opioids to manage your pain, remember these safety tips. ¨ Follow directions carefully. It's easy to misuse opioids if you take a dose other than what's prescribed by your doctor. This can lead to overdose and even death. Even sharing them with someone they weren't meant for is misuse. ¨ Be cautious. Opioids may affect your judgment and decision making. Do not drive or operate machinery until you can think clearly. Talk with your doctor about when it is safe to drive. ¨ Reduce the risk of drug interactions. Opioids can be dangerous if you take them with alcohol or with certain drugs like sleeping pills and muscle relaxers. Make sure your doctor knows about all the other medicines you take, including over-the-counter medicines. Don't start any new medicines before you talk to your doctor or pharmacist. 
Kathya Yeung Keep others safe. Store opioids in a safe and secure place. Make sure that pets, children, friends, and family can't get to them. When you're done using opioids, make sure to properly dispose of them. You can either use a community drug take-back program or your drugstore's mail-back program. If one of these programs isn't available, you can flush opioid skin patches and unused opioid pills down the toilet. ¨ Reduce the risk of overdose. Misuse of opioids can be very dangerous. Protect yourself by asking your doctor about a naloxone rescue kit. It can help you-and even save your life-if you take too much of an opioid. · Try other ways to reduce pain. ¨ Relax, and reduce stress. Relaxation techniques such as deep breathing or meditation can help. ¨ Keep moving. Gentle, daily exercise can help reduce pain over the long run. Try low- or no-impact exercises such as walking, swimming, and stationary biking. Do stretches to stay flexible. ¨ Try heat, cold packs, and massage. ¨ Get enough sleep. Pain can make you tired and drain your energy. Talk with your doctor if you have trouble sleeping because of pain. ¨ Think positive. Your thoughts can affect your pain level. Do things that you enjoy to distract yourself when you have pain instead of focusing on the pain. See a movie, read a book, listen to music, or spend time with a friend. · If you are not taking a prescription pain medicine, ask your doctor if you can take an over-the-counter medicine. When should you call for help? Call your doctor now or seek immediate medical care if: 
? · You have a new kind of pain. ? · You have new symptoms, such as a fever or rash, along with the pain. ? Watch closely for changes in your health, and be sure to contact your doctor if: 
? · You think you might be using too much pain medicine, and you need help to use less or stop. ? · Your pain gets worse. ? · You would like a referral to a doctor or clinic that specializes in pain management. Where can you learn more? Go to http://dave-abhishek.info/. Enter R108 in the search box to learn more about \"Safe Use of Opioid Pain Medicine: Care Instructions. \" Current as of: October 14, 2016 Content Version: 11.4 © 8004-9676 [employer]. Care instructions adapted under license by [employer] (which disclaims liability or warranty for this information). If you have questions about a medical condition or this instruction, always ask your healthcare professional. [address] any warranty or liability for your use of this information. Introducing [facility]! Eloy Christopher introduces Inviragen patient portal. Now you can access parts of your medical record, email your doctor's office, and request medication refills online. 1. In your internet browser, go to [email or web address] 2. Click on the First Time User? Click Here link in the Sign In box. You will see the New Member Sign Up page. 3. Enter your Inviragen Access Code exactly as it appears below. You will not need to use this code after youve completed the sign-up process. If you do not sign up before the expiration date, you must request a new code. · Inviragen Access Code: [email or web address] Expires: 9/13/2018  2:07 PM 
 
4. Enter the last four digits of your Social Security Number (xxxx) and Date of Birth (mm/dd/yyyy) as indicated and click Submit. You will be taken to the next sign-up page. 5. Create a Inviragen ID. This will be your Inviragen login ID and cannot be changed, so think of one that is secure and easy to remember. 6. Create a Inviragen password. You can change your password at any time. 7. Enter your Password Reset Question and Answer. This can be used at a later time if you forget your password. 8. Enter your e-mail address. You will receive e-mail notification when new information is available in [address] 9. Click Sign Up. You can now view and download portions of your medical record. 10. Click the Download Summary menu link to download a portable copy of your medical information. If you have questions, please visit the Frequently Asked Questions section of the Inviragen website. Remember, Inviragen is NOT to be used for urgent needs. For medical emergencies, dial 911. Now available from your iPhone and Android! Please provide this summary of care documentation to your next provider. Your primary care clinician is listed as NOT ON FILE. If you have any questions after today's visit, please call [phone].

## 2018-06-22 NOTE — PROGRESS NOTES
Nursing Notes    Patient presents to the office today in follow-up. Patient rates her pain at 3/10 on the numerical pain scale. Reviewed medications with counts as follows:    Rx Date filled Qty Dispensed Pill Count Last Dose Short   Oxycodone HCL10 mg tab  5/18/18 90 5 Yesterday  no   Oxycontin 20 mg tab ER 5/18/18 60 2 today no                                  Comments:     POC UDS was performed in office today per verbal order and correct read back from provider     Any new labs or imaging since last appointment? NO    Have you been to an emergency room (ER) or urgent care clinic since your last visit? NO            Have you been hospitalized since your last visit? NO     If yes, where, when, and reason for visit? Have you seen or consulted any other health care providers outside of the [address] [place] [address]  since your last visit? NO     If yes, where, when, and reason for visit? Ms. Afshan Mejias has a reminder for a \"due or due soon\" health maintenance. I have asked that she contact her primary care provider for follow-up on this health maintenance.

## 2021-09-20 ENCOUNTER — HOSPITAL ENCOUNTER (EMERGENCY)
Age: 66
Discharge: LEFT BEFORE DISCHARGE | End: 2021-09-20
Attending: FAMILY MEDICINE
Payer: MEDICARE

## 2021-09-20 VITALS
HEIGHT: 65 IN | DIASTOLIC BLOOD PRESSURE: 54 MMHG | RESPIRATION RATE: 18 BRPM | HEART RATE: 105 BPM | SYSTOLIC BLOOD PRESSURE: 151 MMHG | OXYGEN SATURATION: 96 % | TEMPERATURE: 98 F | WEIGHT: 118 LBS | BODY MASS INDEX: 19.66 KG/M2

## 2021-09-20 DIAGNOSIS — J02.9 SORE THROAT: Primary | ICD-10-CM

## 2021-09-20 PROCEDURE — 99283 EMERGENCY DEPT VISIT LOW MDM: CPT

## 2021-09-20 NOTE — ED TRIAGE NOTES
Pt states she has had body aches, fatigue, sore throat x5 days. Pt states \" I feel like I am going to die. \"

## 2021-09-20 NOTE — PROGRESS NOTES
Pt stated that she wanted to leave and did not want to wait. Advised patient it was her right to leave if she wanted to but we are still happy to see her and address her needs. Pt stated she was leaving and left the ER.

## 2021-09-20 NOTE — ED PROVIDER NOTES
HPI     Past Medical History:   Diagnosis Date    Abnormal weight loss     Back pain 1970    Chest pain     COPD (chronic obstructive pulmonary disease) (HCC)     Fatigue     Insomnia     Joint pain     Muscle pain     Neck injury 1970    Neurological disorder     pain,involuntary movements,poor concentration.  OA (osteoarthritis)     Snoring     Stiffness in joint        Past Surgical History:   Procedure Laterality Date    HX TONSIL AND ADENOIDECTOMY  1973         Family History:   Problem Relation Age of Onset    Diabetes Father     Heart Attack Father     Heart Disease Father     Headache Sister     Hypertension Brother        Social History     Socioeconomic History    Marital status: UNKNOWN     Spouse name: Not on file    Number of children: Not on file    Years of education: Not on file    Highest education level: Not on file   Occupational History    Not on file   Tobacco Use    Smoking status: Former Smoker     Packs/day: 0.25    Smokeless tobacco: Never Used    Tobacco comment: Pt quite 4 days ago and wears nicotine patch. Substance and Sexual Activity    Alcohol use: No    Drug use: No    Sexual activity: Not on file     Comment: post  menopausal   Other Topics Concern    Not on file   Social History Narrative    Not on file     Social Determinants of Health     Financial Resource Strain:     Difficulty of Paying Living Expenses:    Food Insecurity:     Worried About Running Out of Food in the Last Year:     Ran Out of Food in the Last Year:    Transportation Needs:     Lack of Transportation (Medical):      Lack of Transportation (Non-Medical):    Physical Activity:     Days of Exercise per Week:     Minutes of Exercise per Session:    Stress:     Feeling of Stress :    Social Connections:     Frequency of Communication with Friends and Family:     Frequency of Social Gatherings with Friends and Family:     Attends Hinduism Services:     Active Member of Clubs or Organizations:     Attends Club or Organization Meetings:     Marital Status:    Intimate Partner Violence:     Fear of Current or Ex-Partner:     Emotionally Abused:     Physically Abused:     Sexually Abused: ALLERGIES: Prozac [fluoxetine]    Review of Systems    Vitals:    09/20/21 1710   BP: (!) 151/54   Pulse: (!) 105   Resp: 18   Temp: 98 °F (36.7 °C)   SpO2: 96%   Weight: 53.5 kg (118 lb)   Height: 5' 5\" (1.651 m)              The patient was brought in by EMS. She was triaged and I went to see her within 30 minutes of her arrival.  She was in the restroom. When she came back from the restroom she told the nurses that she did not want to be here and that she wanted to leave. I did not see the patient or examine her.

## 2024-03-18 NOTE — PROGRESS NOTES
HISTORY OF PRESENT ILLNESS  Sharon Ray is a 58 y.o. female    HPI: Ms. Afshan Mejias  returns today for f/u of Back Pain (lower and mid back) and Neck Pain    Ms. Afshan Mejias  returns today for f/u of chronic low back pain and neck pain. No lumbar or neck surgery. Prior injections with no benefit. H/o PT with little relief. PPMHx: of depression and anxiety     This is my first visit with Ms. Afshan Mejias. She back and neck pain continues unchanged since last visit. She continues to complain of a stressful situation in her home with her friends son who she raised. She is currently raising his daughter, she is 6years old. Current management consists of OxyContin 20 mg every 12 hours, and Oxycodone IR 10 mg 3 times a day as needed. Valium 5 mg will now come from her PCP. Medications are helping with pain control and quality of life. Her pain is 4/10 with medication and 8/10 without. Pt describes pain as aching and stabbing. Aggravating factors include bending and lifting. Relieved with rest, medication, and avoiding painful activities. Current treatment is helping to improve general activity, mood, walking, sleep, enjoyment of life      The patient denies any significant changes since last f/u. She tolerates medications without side effects. Oksana Mejias reports no change in sleep or constipation. No cpap. The patient reports 70% relief with current medications. Measuring clinical outcomes of chronic pain patients: score 7; the lower the number the better the outcome. The patient reports functional improvement and QOL with pain medication. Vitals:    07/17/17 1054   BP: (!) 144/91   Pulse: 87   Resp: 12   Temp: 98.1 °F (36.7 °C)   Weight: 47.6 kg (105 lb)   Height: 5' 5\" (1.651 m)   PainSc:   5   PainLoc: Back        Allergies   Allergen Reactions    Prozac [Fluoxetine] Rash and Itching       History reviewed. No pertinent surgical history.       Review of Systems   Constitutional: Positive Pt had had approx 10 episodes of NBNB vomiting and multiple diarrhea since yesterday, unable to keep anything down. Pt feels \"weak\" and \"bloated.\" Last episode of vomiting was PTA.     No meds PTA.    for weight loss. Negative for chills, diaphoresis, fever and malaise/fatigue. 5-15 pounds over last two years   HENT: Positive for congestion. Negative for ear discharge, ear pain, hearing loss, nosebleeds and sore throat. Eyes: Negative for blurred vision, double vision, pain and discharge. Respiratory: Positive for cough. Negative for hemoptysis, shortness of breath and wheezing. Current smoker- cough   Cardiovascular: Negative for chest pain, palpitations and leg swelling. Gastrointestinal: Negative for constipation, diarrhea, heartburn, nausea and vomiting. Genitourinary: Negative for dysuria, frequency and urgency. Musculoskeletal: Positive for back pain and neck pain. Negative for falls, joint pain and myalgias. Skin: Negative for itching and rash. Neurological: Positive for weakness. Negative for dizziness, tingling, seizures, loss of consciousness and headaches. Psychiatric/Behavioral: Positive for depression. Negative for hallucinations and suicidal ideas. The patient is nervous/anxious and has insomnia. KASH and PTSD       Physical Exam   Constitutional: She is oriented to person, place, and time and well-developed, well-nourished, and in no distress. She appears healthy. Non-toxic appearance. No distress. HENT:   Head: Normocephalic and atraumatic. Right Ear: External ear normal.   Left Ear: External ear normal.   Nose: Nose normal.   Eyes: Lids are normal. Right eye exhibits no discharge. Left eye exhibits no discharge. Neck: Neck supple. Pulmonary/Chest: Effort normal.   Musculoskeletal:        Cervical back: She exhibits bony tenderness and pain. Lumbar back: She exhibits tenderness, bony tenderness and pain. Neurological: She is alert and oriented to person, place, and time. Gait normal. Coordination and gait normal.   Skin: Skin is warm and dry. She is not diaphoretic. No pallor.    Psychiatric: Mood and affect normal.   Nursing note and vitals reviewed. ASSESSMENT:    1. DDD (degenerative disc disease), lumbar    2. Cervical stenosis of spinal canal    3. DDD (degenerative disc disease), cervical    4. Chronic midline low back pain, with sciatica presence unspecified    5. Chronic pain syndrome    6. Cervical spondylosis without myelopathy    7. Scoliosis, unspecified scoliosis type, unspecified spinal region    8. Muscle spasms of both lower extremities    9. Encounter for long-term (current) use of high-risk medication        [facility] Prescription Monitoring Program was reviewed which does not demonstrate aberrancies and/or inconsistencies with regard to the historical prescribing of controlled medications to this patient by other providers. Medications were brought to visit today. Pill count was appropriate. The patients condition and plan were discussed. All questions were answered. The patient agrees with the plan. PLAN / Pt Instructions:  1. Continue current plan with no evidence of addiction or diversion. Stable on current medication without adverse events. 2. Refill Oxycontin  20 mg 2 times daily  3. Refill Oxycodone 10 mg up to 3 times daily as needed  4. Discussed risks of addiction, dependency, and opioid induced hyperalgesia. 5. Return to clinic in 2 months      Medications Ordered Today   Medications    oxyCODONE ER (OXYCONTIN) 20 mg ER tablet     Sig: Take 1 Tab by mouth every twelve (12) hours for 30 days. Max Daily Amount: 40 mg. Dispense:  60 Tab     Refill:  0    oxyCODONE IR (ROXICODONE) 10 mg tab immediate release tablet     Sig: Take 1 Tab by mouth every eight (8) hours as needed for up to 30 days. Max Daily Amount: 30 mg. Dispense:  90 Tab     Refill:  0    oxyCODONE ER (OXYCONTIN) 20 mg ER tablet     Sig: Take 1 Tab by mouth every twelve (12) hours for 30 days. Max Daily Amount: 40 mg.  Indications: Chronic Pain, Severe Pain     Dispense:  60 Tab     Refill:  0    oxyCODONE IR (ROXICODONE) 10 mg tab immediate release tablet     Sig: Take 1 Tab by mouth every eight (8) hours as needed for up to 30 days. Max Daily Amount: 30 mg. Indications: Pain     Dispense:  90 Tab     Refill:  0       Pain medications prescribed with the objective of pain relief and improved physical and psychosocial function in this patient. Spent 25 minutes with patient today reviewing the treatment plan, goals of treatment plan, and limitations of the treatment plan, to include the potential for side effects from medications and procedures. Samira Rodriguez PA-C 7/17/2017      Note: Please excuse any typographical errors.